# Patient Record
Sex: FEMALE | Race: WHITE | NOT HISPANIC OR LATINO | Employment: FULL TIME | URBAN - METROPOLITAN AREA
[De-identification: names, ages, dates, MRNs, and addresses within clinical notes are randomized per-mention and may not be internally consistent; named-entity substitution may affect disease eponyms.]

---

## 2020-02-25 ENCOUNTER — LAB REQUISITION (OUTPATIENT)
Dept: LAB | Facility: OTHER | Age: 70
End: 2020-02-25

## 2020-02-25 DIAGNOSIS — Z00.00 ANNUAL PHYSICAL EXAM: ICD-10-CM

## 2020-02-25 PROCEDURE — 86481 TB AG RESPONSE T-CELL SUSP: CPT | Performed by: EMERGENCY MEDICINE

## 2020-02-27 LAB
TSPOT INTERPRETATION: NEGATIVE
TSPOT NIL CONTROL INTERPRETATION: NORMAL
TSPOT PANEL A: 0
TSPOT PANEL B: 0
TSPOT POS CONTROL INTERPRETATION: NORMAL

## 2021-07-06 ENCOUNTER — APPOINTMENT (OUTPATIENT)
Dept: MRI IMAGING | Facility: HOSPITAL | Age: 71
End: 2021-07-06

## 2021-07-06 ENCOUNTER — APPOINTMENT (OUTPATIENT)
Dept: GENERAL RADIOLOGY | Facility: HOSPITAL | Age: 71
End: 2021-07-06

## 2021-07-06 ENCOUNTER — APPOINTMENT (OUTPATIENT)
Dept: CT IMAGING | Facility: HOSPITAL | Age: 71
End: 2021-07-06

## 2021-07-06 ENCOUNTER — HOSPITAL ENCOUNTER (INPATIENT)
Facility: HOSPITAL | Age: 71
LOS: 4 days | Discharge: NURSING, REHAB OR LONG-TERM CARE | End: 2021-07-10
Attending: EMERGENCY MEDICINE | Admitting: INTERNAL MEDICINE

## 2021-07-06 DIAGNOSIS — I63.9 ACUTE CVA (CEREBROVASCULAR ACCIDENT) (HCC): Primary | ICD-10-CM

## 2021-07-06 LAB
ALBUMIN SERPL-MCNC: 4.4 G/DL (ref 3.5–5.2)
ALBUMIN/GLOB SERPL: 1.4 G/DL
ALP SERPL-CCNC: 61 U/L (ref 39–117)
ALT SERPL W P-5'-P-CCNC: 20 U/L (ref 1–33)
ANION GAP SERPL CALCULATED.3IONS-SCNC: 12.7 MMOL/L (ref 5–15)
AST SERPL-CCNC: 17 U/L (ref 1–32)
BASOPHILS # BLD AUTO: 0.07 10*3/MM3 (ref 0–0.2)
BASOPHILS NFR BLD AUTO: 1.1 % (ref 0–1.5)
BILIRUB SERPL-MCNC: 0.5 MG/DL (ref 0–1.2)
BUN SERPL-MCNC: 13 MG/DL (ref 8–23)
BUN/CREAT SERPL: 23.6 (ref 7–25)
CALCIUM SPEC-SCNC: 9.8 MG/DL (ref 8.6–10.5)
CHLORIDE SERPL-SCNC: 97 MMOL/L (ref 98–107)
CO2 SERPL-SCNC: 26.3 MMOL/L (ref 22–29)
CREAT SERPL-MCNC: 0.55 MG/DL (ref 0.57–1)
DEPRECATED RDW RBC AUTO: 38.8 FL (ref 37–54)
EOSINOPHIL # BLD AUTO: 0.31 10*3/MM3 (ref 0–0.4)
EOSINOPHIL NFR BLD AUTO: 5 % (ref 0.3–6.2)
ERYTHROCYTE [DISTWIDTH] IN BLOOD BY AUTOMATED COUNT: 12.1 % (ref 12.3–15.4)
GFR SERPL CREATININE-BSD FRML MDRD: 109 ML/MIN/1.73
GLOBULIN UR ELPH-MCNC: 3.2 GM/DL
GLUCOSE BLDC GLUCOMTR-MCNC: 122 MG/DL (ref 70–130)
GLUCOSE BLDC GLUCOMTR-MCNC: 171 MG/DL (ref 70–130)
GLUCOSE BLDC GLUCOMTR-MCNC: 208 MG/DL (ref 70–130)
GLUCOSE SERPL-MCNC: 118 MG/DL (ref 65–99)
HCT VFR BLD AUTO: 40.6 % (ref 34–46.6)
HGB BLD-MCNC: 13.7 G/DL (ref 12–15.9)
IMM GRANULOCYTES # BLD AUTO: 0.01 10*3/MM3 (ref 0–0.05)
IMM GRANULOCYTES NFR BLD AUTO: 0.2 % (ref 0–0.5)
LYMPHOCYTES # BLD AUTO: 2.35 10*3/MM3 (ref 0.7–3.1)
LYMPHOCYTES NFR BLD AUTO: 37.7 % (ref 19.6–45.3)
MCH RBC QN AUTO: 29.6 PG (ref 26.6–33)
MCHC RBC AUTO-ENTMCNC: 33.7 G/DL (ref 31.5–35.7)
MCV RBC AUTO: 87.7 FL (ref 79–97)
MONOCYTES # BLD AUTO: 0.51 10*3/MM3 (ref 0.1–0.9)
MONOCYTES NFR BLD AUTO: 8.2 % (ref 5–12)
NEUTROPHILS NFR BLD AUTO: 2.98 10*3/MM3 (ref 1.7–7)
NEUTROPHILS NFR BLD AUTO: 47.8 % (ref 42.7–76)
NRBC BLD AUTO-RTO: 0 /100 WBC (ref 0–0.2)
PLATELET # BLD AUTO: 296 10*3/MM3 (ref 140–450)
PMV BLD AUTO: 9.6 FL (ref 6–12)
POTASSIUM SERPL-SCNC: 3.2 MMOL/L (ref 3.5–5.2)
PROT SERPL-MCNC: 7.6 G/DL (ref 6–8.5)
QT INTERVAL: 447 MS
RBC # BLD AUTO: 4.63 10*6/MM3 (ref 3.77–5.28)
SARS-COV-2 RNA RESP QL NAA+PROBE: NOT DETECTED
SODIUM SERPL-SCNC: 136 MMOL/L (ref 136–145)
TROPONIN T SERPL-MCNC: <0.01 NG/ML (ref 0–0.03)
WBC # BLD AUTO: 6.23 10*3/MM3 (ref 3.4–10.8)

## 2021-07-06 PROCEDURE — 3E03317 INTRODUCTION OF OTHER THROMBOLYTIC INTO PERIPHERAL VEIN, PERCUTANEOUS APPROACH: ICD-10-PCS | Performed by: PSYCHIATRY & NEUROLOGY

## 2021-07-06 PROCEDURE — 0042T HC CT CEREBRAL PERFUSION W/WO CONTRAST: CPT

## 2021-07-06 PROCEDURE — 70498 CT ANGIOGRAPHY NECK: CPT

## 2021-07-06 PROCEDURE — 99285 EMERGENCY DEPT VISIT HI MDM: CPT

## 2021-07-06 PROCEDURE — 0 IOPAMIDOL PER 1 ML: Performed by: EMERGENCY MEDICINE

## 2021-07-06 PROCEDURE — 85025 COMPLETE CBC W/AUTO DIFF WBC: CPT | Performed by: PHYSICIAN ASSISTANT

## 2021-07-06 PROCEDURE — 70496 CT ANGIOGRAPHY HEAD: CPT

## 2021-07-06 PROCEDURE — 82565 ASSAY OF CREATININE: CPT

## 2021-07-06 PROCEDURE — 25010000002 METHYLPREDNISOLONE PER 125 MG: Performed by: PSYCHIATRY & NEUROLOGY

## 2021-07-06 PROCEDURE — 70551 MRI BRAIN STEM W/O DYE: CPT

## 2021-07-06 PROCEDURE — 63710000001 INSULIN LISPRO (HUMAN) PER 5 UNITS: Performed by: INTERNAL MEDICINE

## 2021-07-06 PROCEDURE — 80053 COMPREHEN METABOLIC PANEL: CPT | Performed by: PHYSICIAN ASSISTANT

## 2021-07-06 PROCEDURE — 93010 ELECTROCARDIOGRAM REPORT: CPT | Performed by: INTERNAL MEDICINE

## 2021-07-06 PROCEDURE — U0003 INFECTIOUS AGENT DETECTION BY NUCLEIC ACID (DNA OR RNA); SEVERE ACUTE RESPIRATORY SYNDROME CORONAVIRUS 2 (SARS-COV-2) (CORONAVIRUS DISEASE [COVID-19]), AMPLIFIED PROBE TECHNIQUE, MAKING USE OF HIGH THROUGHPUT TECHNOLOGIES AS DESCRIBED BY CMS-2020-01-R: HCPCS | Performed by: EMERGENCY MEDICINE

## 2021-07-06 PROCEDURE — 82962 GLUCOSE BLOOD TEST: CPT

## 2021-07-06 PROCEDURE — 93005 ELECTROCARDIOGRAM TRACING: CPT | Performed by: PHYSICIAN ASSISTANT

## 2021-07-06 PROCEDURE — 25010000002 ALTEPLASE PER 1 MG: Performed by: PSYCHIATRY & NEUROLOGY

## 2021-07-06 PROCEDURE — 25010000002 DIPHENHYDRAMINE PER 50 MG

## 2021-07-06 PROCEDURE — 84484 ASSAY OF TROPONIN QUANT: CPT | Performed by: PHYSICIAN ASSISTANT

## 2021-07-06 PROCEDURE — 99291 CRITICAL CARE FIRST HOUR: CPT | Performed by: PSYCHIATRY & NEUROLOGY

## 2021-07-06 PROCEDURE — 4A03X5D MEASUREMENT OF ARTERIAL FLOW, INTRACRANIAL, EXTERNAL APPROACH: ICD-10-PCS | Performed by: RADIOLOGY

## 2021-07-06 RX ORDER — AMLODIPINE BESYLATE 2.5 MG/1
2.5 TABLET ORAL DAILY
COMMUNITY
End: 2021-07-10 | Stop reason: HOSPADM

## 2021-07-06 RX ORDER — LEVOTHYROXINE SODIUM 0.1 MG/1
100 TABLET ORAL DAILY
COMMUNITY

## 2021-07-06 RX ORDER — LEVOTHYROXINE SODIUM 20 UG/ML
50 INJECTION, SOLUTION INTRAVENOUS
Status: DISCONTINUED | OUTPATIENT
Start: 2021-07-06 | End: 2021-07-07

## 2021-07-06 RX ORDER — FLUOXETINE HYDROCHLORIDE 20 MG/1
20 CAPSULE ORAL DAILY
COMMUNITY

## 2021-07-06 RX ORDER — MULTIVIT-MIN/IRON/FOLIC ACID/K 18-600-40
400 CAPSULE ORAL DAILY
COMMUNITY

## 2021-07-06 RX ORDER — POTASSIUM CHLORIDE 1.5 G/1.77G
40 POWDER, FOR SOLUTION ORAL AS NEEDED
Status: DISCONTINUED | OUTPATIENT
Start: 2021-07-06 | End: 2021-07-10 | Stop reason: HOSPADM

## 2021-07-06 RX ORDER — SODIUM CHLORIDE 9 MG/ML
75 INJECTION, SOLUTION INTRAVENOUS CONTINUOUS
Status: DISCONTINUED | OUTPATIENT
Start: 2021-07-06 | End: 2021-07-08

## 2021-07-06 RX ORDER — MAGNESIUM SULFATE HEPTAHYDRATE 40 MG/ML
2 INJECTION, SOLUTION INTRAVENOUS AS NEEDED
Status: DISCONTINUED | OUTPATIENT
Start: 2021-07-06 | End: 2021-07-10 | Stop reason: HOSPADM

## 2021-07-06 RX ORDER — AMOXICILLIN 250 MG
2 CAPSULE ORAL 2 TIMES DAILY
Status: DISCONTINUED | OUTPATIENT
Start: 2021-07-06 | End: 2021-07-10 | Stop reason: HOSPADM

## 2021-07-06 RX ORDER — CALCIUM GLUCONATE 20 MG/ML
1 INJECTION, SOLUTION INTRAVENOUS AS NEEDED
Status: DISCONTINUED | OUTPATIENT
Start: 2021-07-06 | End: 2021-07-10 | Stop reason: HOSPADM

## 2021-07-06 RX ORDER — ONDANSETRON 2 MG/ML
4 INJECTION INTRAMUSCULAR; INTRAVENOUS EVERY 6 HOURS PRN
Status: DISCONTINUED | OUTPATIENT
Start: 2021-07-06 | End: 2021-07-10 | Stop reason: HOSPADM

## 2021-07-06 RX ORDER — POTASSIUM CHLORIDE 750 MG/1
40 TABLET, FILM COATED, EXTENDED RELEASE ORAL AS NEEDED
Status: DISCONTINUED | OUTPATIENT
Start: 2021-07-06 | End: 2021-07-10 | Stop reason: HOSPADM

## 2021-07-06 RX ORDER — ENALAPRILAT 2.5 MG/2ML
0.62 INJECTION INTRAVENOUS EVERY 6 HOURS PRN
Status: DISCONTINUED | OUTPATIENT
Start: 2021-07-06 | End: 2021-07-10 | Stop reason: HOSPADM

## 2021-07-06 RX ORDER — SODIUM CHLORIDE 0.9 % (FLUSH) 0.9 %
10 SYRINGE (ML) INJECTION EVERY 12 HOURS SCHEDULED
Status: DISCONTINUED | OUTPATIENT
Start: 2021-07-06 | End: 2021-07-10

## 2021-07-06 RX ORDER — NICOTINE POLACRILEX 4 MG
15 LOZENGE BUCCAL
Status: DISCONTINUED | OUTPATIENT
Start: 2021-07-06 | End: 2021-07-10 | Stop reason: HOSPADM

## 2021-07-06 RX ORDER — SOLIFENACIN SUCCINATE 5 MG/1
5 TABLET, FILM COATED ORAL DAILY
COMMUNITY
End: 2021-07-10 | Stop reason: HOSPADM

## 2021-07-06 RX ORDER — DEXTROSE MONOHYDRATE 25 G/50ML
25 INJECTION, SOLUTION INTRAVENOUS
Status: DISCONTINUED | OUTPATIENT
Start: 2021-07-06 | End: 2021-07-10 | Stop reason: HOSPADM

## 2021-07-06 RX ORDER — SODIUM CHLORIDE 0.9 % (FLUSH) 0.9 %
10 SYRINGE (ML) INJECTION AS NEEDED
Status: DISCONTINUED | OUTPATIENT
Start: 2021-07-06 | End: 2021-07-10 | Stop reason: HOSPADM

## 2021-07-06 RX ORDER — ATORVASTATIN CALCIUM 80 MG/1
80 TABLET, FILM COATED ORAL NIGHTLY
Status: DISCONTINUED | OUTPATIENT
Start: 2021-07-06 | End: 2021-07-10 | Stop reason: HOSPADM

## 2021-07-06 RX ORDER — ACETAMINOPHEN 650 MG/1
650 SUPPOSITORY RECTAL EVERY 4 HOURS PRN
Status: DISCONTINUED | OUTPATIENT
Start: 2021-07-06 | End: 2021-07-10 | Stop reason: HOSPADM

## 2021-07-06 RX ORDER — BISACODYL 5 MG/1
5 TABLET, DELAYED RELEASE ORAL DAILY PRN
Status: DISCONTINUED | OUTPATIENT
Start: 2021-07-06 | End: 2021-07-10 | Stop reason: HOSPADM

## 2021-07-06 RX ORDER — MAGNESIUM SULFATE HEPTAHYDRATE 40 MG/ML
4 INJECTION, SOLUTION INTRAVENOUS AS NEEDED
Status: DISCONTINUED | OUTPATIENT
Start: 2021-07-06 | End: 2021-07-10 | Stop reason: HOSPADM

## 2021-07-06 RX ORDER — SIMVASTATIN 40 MG
40 TABLET ORAL NIGHTLY
COMMUNITY
End: 2021-07-10 | Stop reason: HOSPADM

## 2021-07-06 RX ORDER — INSULIN LISPRO 100 [IU]/ML
0-9 INJECTION, SOLUTION INTRAVENOUS; SUBCUTANEOUS EVERY 6 HOURS
Status: DISCONTINUED | OUTPATIENT
Start: 2021-07-06 | End: 2021-07-08

## 2021-07-06 RX ORDER — SODIUM CHLORIDE 0.9 % (FLUSH) 0.9 %
10 SYRINGE (ML) INJECTION AS NEEDED
Status: DISCONTINUED | OUTPATIENT
Start: 2021-07-06 | End: 2021-07-09 | Stop reason: SDUPTHER

## 2021-07-06 RX ORDER — ACETAMINOPHEN 325 MG/1
650 TABLET ORAL EVERY 4 HOURS PRN
Status: DISCONTINUED | OUTPATIENT
Start: 2021-07-06 | End: 2021-07-10 | Stop reason: HOSPADM

## 2021-07-06 RX ORDER — LISINOPRIL AND HYDROCHLOROTHIAZIDE 20; 12.5 MG/1; MG/1
1 TABLET ORAL DAILY
COMMUNITY

## 2021-07-06 RX ORDER — SODIUM CHLORIDE 9 MG/ML
100 INJECTION, SOLUTION INTRAVENOUS ONCE
Status: COMPLETED | OUTPATIENT
Start: 2021-07-06 | End: 2021-07-06

## 2021-07-06 RX ORDER — METHYLPREDNISOLONE SODIUM SUCCINATE 125 MG/2ML
125 INJECTION, POWDER, LYOPHILIZED, FOR SOLUTION INTRAMUSCULAR; INTRAVENOUS ONCE
Status: COMPLETED | OUTPATIENT
Start: 2021-07-06 | End: 2021-07-06

## 2021-07-06 RX ORDER — ASPIRIN 325 MG
325 TABLET ORAL DAILY
Status: DISCONTINUED | OUTPATIENT
Start: 2021-07-07 | End: 2021-07-10 | Stop reason: HOSPADM

## 2021-07-06 RX ORDER — ASPIRIN 300 MG/1
300 SUPPOSITORY RECTAL DAILY
Status: DISCONTINUED | OUTPATIENT
Start: 2021-07-07 | End: 2021-07-10

## 2021-07-06 RX ORDER — SODIUM CHLORIDE 0.9 % (FLUSH) 0.9 %
10 SYRINGE (ML) INJECTION EVERY 12 HOURS SCHEDULED
Status: DISCONTINUED | OUTPATIENT
Start: 2021-07-06 | End: 2021-07-10 | Stop reason: HOSPADM

## 2021-07-06 RX ORDER — ONDANSETRON 4 MG/1
4 TABLET, FILM COATED ORAL EVERY 6 HOURS PRN
Status: DISCONTINUED | OUTPATIENT
Start: 2021-07-06 | End: 2021-07-10 | Stop reason: HOSPADM

## 2021-07-06 RX ORDER — DIPHENHYDRAMINE HYDROCHLORIDE 50 MG/ML
50 INJECTION INTRAMUSCULAR; INTRAVENOUS ONCE
Status: COMPLETED | OUTPATIENT
Start: 2021-07-06 | End: 2021-07-06

## 2021-07-06 RX ORDER — DIPHENHYDRAMINE HYDROCHLORIDE 50 MG/ML
INJECTION INTRAMUSCULAR; INTRAVENOUS
Status: COMPLETED
Start: 2021-07-06 | End: 2021-07-06

## 2021-07-06 RX ORDER — BISACODYL 10 MG
10 SUPPOSITORY, RECTAL RECTAL DAILY PRN
Status: DISCONTINUED | OUTPATIENT
Start: 2021-07-06 | End: 2021-07-10 | Stop reason: HOSPADM

## 2021-07-06 RX ORDER — POTASSIUM CHLORIDE 7.45 MG/ML
10 INJECTION INTRAVENOUS
Status: DISCONTINUED | OUTPATIENT
Start: 2021-07-06 | End: 2021-07-10 | Stop reason: HOSPADM

## 2021-07-06 RX ORDER — POLYETHYLENE GLYCOL 3350 17 G/17G
17 POWDER, FOR SOLUTION ORAL DAILY PRN
Status: DISCONTINUED | OUTPATIENT
Start: 2021-07-06 | End: 2021-07-10 | Stop reason: HOSPADM

## 2021-07-06 RX ADMIN — LEVOTHYROXINE SODIUM 50 MCG: 20 INJECTION, SOLUTION INTRAVENOUS at 15:25

## 2021-07-06 RX ADMIN — INSULIN LISPRO 2 UNITS: 100 INJECTION, SOLUTION INTRAVENOUS; SUBCUTANEOUS at 23:27

## 2021-07-06 RX ADMIN — INSULIN LISPRO 4 UNITS: 100 INJECTION, SOLUTION INTRAVENOUS; SUBCUTANEOUS at 17:51

## 2021-07-06 RX ADMIN — ALTEPLASE 65.37 MG: KIT at 09:50

## 2021-07-06 RX ADMIN — SODIUM CHLORIDE, PRESERVATIVE FREE 10 ML: 5 INJECTION INTRAVENOUS at 15:26

## 2021-07-06 RX ADMIN — ALTEPLASE 7.26 MG: KIT at 09:45

## 2021-07-06 RX ADMIN — DIPHENHYDRAMINE HYDROCHLORIDE 50 MG: 50 INJECTION INTRAMUSCULAR; INTRAVENOUS at 11:10

## 2021-07-06 RX ADMIN — SODIUM CHLORIDE, PRESERVATIVE FREE 10 ML: 5 INJECTION INTRAVENOUS at 20:30

## 2021-07-06 RX ADMIN — SODIUM CHLORIDE 75 ML/HR: 9 INJECTION, SOLUTION INTRAVENOUS at 13:55

## 2021-07-06 RX ADMIN — SODIUM CHLORIDE 75 ML/HR: 9 INJECTION, SOLUTION INTRAVENOUS at 23:30

## 2021-07-06 RX ADMIN — DOCUSATE SODIUM 50MG AND SENNOSIDES 8.6MG 2 TABLET: 8.6; 5 TABLET, FILM COATED ORAL at 20:29

## 2021-07-06 RX ADMIN — SODIUM CHLORIDE 100 ML/HR: 9 INJECTION, SOLUTION INTRAVENOUS at 11:21

## 2021-07-06 RX ADMIN — METHYLPREDNISOLONE SODIUM SUCCINATE 125 MG: 125 INJECTION, POWDER, FOR SOLUTION INTRAMUSCULAR; INTRAVENOUS at 11:10

## 2021-07-06 RX ADMIN — IOPAMIDOL 150 ML: 755 INJECTION, SOLUTION INTRAVENOUS at 08:46

## 2021-07-06 RX ADMIN — ATORVASTATIN CALCIUM 80 MG: 80 TABLET, FILM COATED ORAL at 20:29

## 2021-07-06 NOTE — SIGNIFICANT NOTE
07/06/21 1446   OTHER   Discipline speech language pathologist   Rehab Time/Intention   Session Not Performed other (see comments)  (SLP Consult received. RN reported patient not ready for Swallow eval at this time and that she will complete RN swallow screen as appropriate. SLP to check back next date.)

## 2021-07-06 NOTE — CONSULTS
Neurology Consult Note    Consult Date: 7/6/2021    Referring MD: Dr. Peter    Reason for Consult I have been asked to see the patient in neurological consultation to render advice and opinion regarding acute stroke    Ambar Bingham is a 70 y.o. female with past medical history of hypertension and diabetes who presented to the hospital with last known normal at approximately 10 PM last night.  She was found by her son today at 7:30 AM with aphasia.  She presented to the emergency department with aphasia no other focal deficits.  Team D imaging was performed CT head CTA and CT perfusion which revealed a large area of tissue at risk in the left temporal lobe.  Her deficits have not improved since arrival.  She was taken for team D MRI which showed areas of diffusion restriction with no significant T2 flair correlation.  tPA was given.    Past medical history: Essential hypertension, diabetes    ROS: Obtained from family  No fevers, chills  No weakness, numbness , + aphasia    Exam  BP (!) 181/95   Pulse 79   Temp 97.8 °F (36.6 °C) (Tympanic)   Resp 16   Wt 80.7 kg (178 lb)   SpO2 97%   Gen: NAD, vitals reviewed  MS: Awake, alert, receptive aphasia, no neglect, following commands intermittently  CN: visual acuity grossly normal, PERRL, EOMI, no facial droop, no dysarthria  Motor: 5/5 throughout upper and lower extremities, normal tone    DATA:    Lab Results   Component Value Date    GLUCOSE 118 (H) 07/06/2021    CALCIUM 9.8 07/06/2021     07/06/2021    K 3.2 (L) 07/06/2021    CO2 26.3 07/06/2021    CL 97 (L) 07/06/2021    BUN 13 07/06/2021    CREATININE 0.55 (L) 07/06/2021    EGFRIFNONA 109 07/06/2021    BCR 23.6 07/06/2021    ANIONGAP 12.7 07/06/2021     Lab Results   Component Value Date    WBC 6.23 07/06/2021    HGB 13.7 07/06/2021    HCT 40.6 07/06/2021    MCV 87.7 07/06/2021     07/06/2021       Lab review: CBC, BMP unremarkable    Imaging review: I personally reviewed her CT head, CTA head and  neck, CT perfusion and MRI.  CT head showed no acute findings, CT perfusion showed an area of tissue risk in the left temporal lobe with no core infarct, CTA head and neck showed no significant flow-limiting stenosis in the left M1 or M2 use but a likely perisylvian branch occlusion, possibly in an M5 branch.  On additional secondary read by radiology there is some concern for a weblike area of possible thrombus in left carotid artery which may be the original source, MRI shows multiple areas of small restricted diffusion in the left temporal lobe with no significant T2 flair correlation, no increased decrease signal on GRE sequences.  Discussed in detail with the reading radiologist.    Diagnoses:  Stroke, left middle cerebral artery, embolic  Received TPA in the emergency department  Essential hypertension    Comment: Left MCA stroke, perisylvian branch occlusion with Warnicke's aphasia, status post TPA after a wake-up protocol MRI.  Plan to admit to ICU for post TPA care.  Suspected cardioembolic etiology but there is also a area of potential clot in the left ICA that may have been the original source.    PLAN:   Received TPA, goal blood pressure less than 180/105  ICU admission  2D echo complete  IV fluids     I was present in the ED for greater than 31 minutes performing critical care including patient assessment, imaging review, care coordination, and TPA decision making.

## 2021-07-06 NOTE — ED PROVIDER NOTES
EMERGENCY DEPARTMENT ENCOUNTER    Room Number:  I372/1  Date of encounter:  7/6/2021  PCP: Provider, No Known  Historian: Patient, EMS      I used full protective equipment while examining this patient.  This includes face mask, gloves and protective eyewear.  I washed my hands before entering the room and immediately upon leaving the room      HPI:  Chief Complaint: Confusion, altered mental status  A complete HPI/ROS/PMH/PSH/SH/FH are unobtainable due to: Altered mental status, aphasia    Context: Ambar Bingham is a 70 y.o. female who presents to the ED c/o confusion, altered mental status.  Patient was found by her son this morning wandering the house confused and having difficulty speaking.  Patient was last seen normal at 10 PM last night.  At this time patient has marked aphasia and confusion.  She has no dysarthria or limb weakness.  Patient unable to give any reliable history secondary to aphasia.    Review of Medical Records  I reviewed patient's last office visit with her family medicine doctor from 6/21/2021.  Patient being treated for diabetes, hypertension, dyslipidemia.    PAST MEDICAL HISTORY  Active Ambulatory Problems     Diagnosis Date Noted   • No Active Ambulatory Problems     Resolved Ambulatory Problems     Diagnosis Date Noted   • No Resolved Ambulatory Problems     No Additional Past Medical History         PAST SURGICAL HISTORY  No past surgical history on file.      FAMILY HISTORY  No family history on file.      SOCIAL HISTORY  Social History     Socioeconomic History   • Marital status: Single     Spouse name: Not on file   • Number of children: Not on file   • Years of education: Not on file   • Highest education level: Not on file   Tobacco Use   • Smoking status: Never Smoker   • Smokeless tobacco: Never Used         ALLERGIES  Patient has no known allergies.        REVIEW OF SYSTEMS  Unobtainable secondary to altered mental status      PHYSICAL EXAM    I have reviewed the triage vital  signs and nursing notes.    ED Triage Vitals   Temp Heart Rate Resp BP SpO2   07/06/21 0825 07/06/21 0821 07/06/21 0821 07/06/21 0821 07/06/21 0821   97.8 °F (36.6 °C) 73 10 (!) 173/113 97 %      Temp src Heart Rate Source Patient Position BP Location FiO2 (%)   07/06/21 0825 -- -- -- --   Tympanic           Physical Exam    GENERAL: Alert, mild distress, confused   HENT: head atraumatic, no nuchal rigidity  EYES: no scleral icterus, EOMI  CV: regular rhythm, regular rate, no murmur  RESPIRATORY: normal effort, CTA  ABDOMEN: soft, nontender  MUSCULOSKELETAL: no deformity, FROM, no calf swelling or tenderness  NEURO: alert, disoriented to time and place, 5/5 strength in all extremities, no facial droop.  Moderate expressive aphasia.  Patient unable to perform finger-to-nose secondary to confusion.  SKIN: warm, dry    NIH Stroke Scale: 6        LAB RESULTS  Recent Results (from the past 24 hour(s))   Comprehensive Metabolic Panel    Collection Time: 07/06/21  8:24 AM    Specimen: Blood   Result Value Ref Range    Glucose 118 (H) 65 - 99 mg/dL    BUN 13 8 - 23 mg/dL    Creatinine 0.55 (L) 0.57 - 1.00 mg/dL    Sodium 136 136 - 145 mmol/L    Potassium 3.2 (L) 3.5 - 5.2 mmol/L    Chloride 97 (L) 98 - 107 mmol/L    CO2 26.3 22.0 - 29.0 mmol/L    Calcium 9.8 8.6 - 10.5 mg/dL    Total Protein 7.6 6.0 - 8.5 g/dL    Albumin 4.40 3.50 - 5.20 g/dL    ALT (SGPT) 20 1 - 33 U/L    AST (SGOT) 17 1 - 32 U/L    Alkaline Phosphatase 61 39 - 117 U/L    Total Bilirubin 0.5 0.0 - 1.2 mg/dL    eGFR Non African Amer 109 >60 mL/min/1.73    Globulin 3.2 gm/dL    A/G Ratio 1.4 g/dL    BUN/Creatinine Ratio 23.6 7.0 - 25.0    Anion Gap 12.7 5.0 - 15.0 mmol/L   Troponin    Collection Time: 07/06/21  8:24 AM    Specimen: Blood   Result Value Ref Range    Troponin T <0.010 0.000 - 0.030 ng/mL   CBC Auto Differential    Collection Time: 07/06/21  8:24 AM    Specimen: Blood   Result Value Ref Range    WBC 6.23 3.40 - 10.80 10*3/mm3    RBC 4.63 3.77 -  5.28 10*6/mm3    Hemoglobin 13.7 12.0 - 15.9 g/dL    Hematocrit 40.6 34.0 - 46.6 %    MCV 87.7 79.0 - 97.0 fL    MCH 29.6 26.6 - 33.0 pg    MCHC 33.7 31.5 - 35.7 g/dL    RDW 12.1 (L) 12.3 - 15.4 %    RDW-SD 38.8 37.0 - 54.0 fl    MPV 9.6 6.0 - 12.0 fL    Platelets 296 140 - 450 10*3/mm3    Neutrophil % 47.8 42.7 - 76.0 %    Lymphocyte % 37.7 19.6 - 45.3 %    Monocyte % 8.2 5.0 - 12.0 %    Eosinophil % 5.0 0.3 - 6.2 %    Basophil % 1.1 0.0 - 1.5 %    Immature Grans % 0.2 0.0 - 0.5 %    Neutrophils, Absolute 2.98 1.70 - 7.00 10*3/mm3    Lymphocytes, Absolute 2.35 0.70 - 3.10 10*3/mm3    Monocytes, Absolute 0.51 0.10 - 0.90 10*3/mm3    Eosinophils, Absolute 0.31 0.00 - 0.40 10*3/mm3    Basophils, Absolute 0.07 0.00 - 0.20 10*3/mm3    Immature Grans, Absolute 0.01 0.00 - 0.05 10*3/mm3    nRBC 0.0 0.0 - 0.2 /100 WBC   COVID-19,[facility] IN-HOUSE CEPHEID/JOVON NP SWAB IN TRANSPORT MEDIA 8-12 HR TAT - Swab, Nasopharynx    Collection Time: 07/06/21  8:52 AM    Specimen: Nasopharynx; Swab   Result Value Ref Range    COVID19 Not Detected Not Detected - Ref. Range   ECG 12 Lead    Collection Time: 07/06/21 11:22 AM   Result Value Ref Range    QT Interval 447 ms   POC Glucose Once    Collection Time: 07/06/21 12:28 PM    Specimen: Blood   Result Value Ref Range    Glucose 122 70 - 130 mg/dL       Ordered the above labs and independently reviewed the results.        RADIOLOGY  CT Angiogram Neck, CT CEREBRAL PERFUSION WITH & WITHOUT CONTRAST, CT Angiogram Head w AI Analysis of LVO    Result Date: 7/6/2021    CT ANGIOGRAM NECK AND HEAD WITH CONTRAST AND CT PERFUSION WITH CONTRAST  HISTORY: Aphasia, confusion.  COMPARISON: None.  FINDINGS:  Initially, a noncontrasted CT examination of the brain was performed. The brain and ventricles are symmetrical. There is no evidence of hemorrhage, hydrocephalus or of abnormal extra-axial fluid. No focal area of decreased attenuation to suggest acute infarction was identified. The above information  was called to and discussed with Dr. Arzate. Dr. Arzate requested further evaluation with CT perfusion and CT angiography.  CT PERFUSION: There is delayed perfusion involving the inferior division of the left MCA estimated to be approximately 33 cc in volume and without corresponding CBF abnormality.  CT ANGIOGRAM NECK AND HEAD WITH CONTRAST: A CT angiogram of the neck and head was performed. Multiplanar, as well as 3-dimensional reconstructions were generated.  FINDINGS: There is a bovine configuration to the great vessels. There is mild atherosclerotic disease appreciated involving the carotid bifurcations bilaterally, but with 0% stenosis using NASCET criteria. There is a weblike filling defect involving the proximal aspects of the internal carotid artery on the left posteriorly and medially. There is no evidence of dangling clot. The distal aspects of the internal carotid arteries and the proximal aspects of the anterior cerebral arteries and of the right middle cerebral artery appear unremarkable. There is a filling defect appreciated involving the inferior division of the left middle cerebral artery. This involves a prominent branch within the anterior aspect of the sylvian fissure. No other filling defects are appreciated.  Both vertebral arteries were opacified. The left vertebral artery is slightly larger than that of the right. The basilar artery and right posterior cerebral artery appear unremarkable. There is a fetal origin of the left posterior cerebral artery.      1. There was no evidence of acute infarction or hemorrhage on the noncontrasted CT examination of the brain. 2. The CT perfusion did demonstrate 33 cc of delayed perfusion involving the inferior division of the left middle cerebral artery without corresponding CBF abnormality. 3. Clot was identified within the inferior division of the left MCA anteriorly within a prominent anterior sylvian branch. 4. There is mild irregularity involving  the left internal carotid artery with an adjacent thin web. There is no evidence of dangling clot.  The noncontrasted CT examination of the brain was made available for interpretation at 0835 hours and results called to Dr. Arzate at 0836 hours. The CT angiogram was made available for interpretation at 0847 hours and results called to Dr. Arzate at 0850 hours.  AI analysis of LVO was utilized.  Radiation dose reduction techniques were utilized, including automated exposure control and exposure modulation based on body size.       MRI Brain Without Contrast    Result Date: 7/6/2021  LIMITED NONCONTRASTED MRI EXAMINATION OF THE BRAIN  HISTORY: Stroke.  TECHNIQUE: A limited MRI examination of the brain was performed utilizing axial diffusion, T2 FLAIR and gradient echo T2 imaging.  COMPARISON: CT angiogram 07/06/2021.  FINDINGS: The diffusion sequence demonstrates a curvilinear area of restricted diffusion involving the left parietal lobe superiolaterally, cortical and subcortical measuring 10 x 3 mm in size. A smaller subcortical area of restricted diffusion involving the left occipital lobe posteriorly is appreciated measuring approximately 7 mm in size. There is subtle gyriform diffusion weighted hyperintensity involving the parieto-occipital region on the left posterior laterally. There is a questionable mild area of delayed perfusion involving the subcortical white matter of the frontoparietal region on the left superiorly measuring approximately 10 mm in AP dimension.  The T2 FLAIR sequence demonstrates increased signal intensity within branches of the left MCA consistent with slow flow. There is minimal diffusion hyperintensity related to the area of suspected acute infarction involving the left parietal lobe. There was no evidence of hemorrhage.      1. Small areas of restricted diffusion consistent with areas of infarction are noted, as well as subtle diffusion weighted hyperintensity involving the  parieto-occipital cortex posterior laterally. There is only minimal diffusion hyperintensity appreciated. 2. There is increased signal intensity present within the branches of the inferior division of the left MCA consistent with slow flow evident on the axial T2 FLAIR sequence. The above information was discussed with Dr. Arzate while the patient was still in the MRI scanner.        I ordered the above noted radiological studies. Reviewed by me and discussed with radiologist.  See dictation for official radiology interpretation.      MEDICATIONS GIVEN IN ER    Medications   sodium chloride 0.9 % flush 10 mL (has no administration in time range)   sodium chloride 0.9 % flush 10 mL (10 mL Intravenous Not Given 7/6/21 1352)   sodium chloride 0.9 % flush 10 mL (has no administration in time range)   aspirin tablet 325 mg (has no administration in time range)     Or   aspirin suppository 300 mg (has no administration in time range)   atorvastatin (LIPITOR) tablet 80 mg (has no administration in time range)   sodium chloride 0.9 % infusion (75 mL/hr Intravenous New Bag 7/6/21 1355)   Levothyroxine Sodium injection 50 mcg (50 mcg Intravenous Given 7/6/21 1525)   dextrose (GLUTOSE) oral gel 15 g (has no administration in time range)   dextrose (D50W) 25 g/ 50mL Intravenous Solution 25 g (has no administration in time range)   glucagon (human recombinant) (GLUCAGEN DIAGNOSTIC) injection 1 mg (has no administration in time range)   sodium chloride 0.9 % flush 10 mL (10 mL Intravenous Given 7/6/21 1526)   sodium chloride 0.9 % flush 10 mL (has no administration in time range)   acetaminophen (TYLENOL) tablet 650 mg (has no administration in time range)     Or   acetaminophen (TYLENOL) suppository 650 mg (has no administration in time range)   potassium chloride (K-DUR,KLOR-CON) ER tablet 40 mEq (has no administration in time range)     Or   potassium chloride (KLOR-CON) packet 40 mEq (has no administration in time range)      Or   potassium chloride 10 mEq in 100 mL IVPB (has no administration in time range)   Magnesium Sulfate 2 gram Bolus, followed by 8 gram infusion (total Mg dose 10 grams)- Mg less than or equal to 1mg/dL (has no administration in time range)     Or   Magnesium Sulfate 2 gram / 50mL Infusion (GIVE X 3 BAGS TO EQUAL 6GM TOTAL DOSE) - Mg 1.1 - 1.5 mg/dl (has no administration in time range)     Or   Magnesium Sulfate 4 gram infusion- Mg 1.6-1.9 mg/dL (has no administration in time range)   potassium phosphate 45 mmol in sodium chloride 0.9 % 500 mL infusion (has no administration in time range)     Or   potassium phosphate 30 mmol in sodium chloride 0.9 % 250 mL infusion (has no administration in time range)     Or   potassium phosphate 15 mmol in sodium chloride 0.9 % 100 mL infusion (has no administration in time range)     Or   sodium phosphates 40 mmol in sodium chloride 0.9 % 500 mL IVPB (has no administration in time range)     Or   sodium phosphates 20 mmol in sodium chloride 0.9 % 250 mL IVPB (has no administration in time range)   calcium gluconate 1g/50ml 0.675% NaCl IV SOLN (has no administration in time range)     And   calcium gluconate 6 g in sodium chloride 0.9 % 500 mL IVPB (has no administration in time range)   enalaprilat (VASOTEC) injection 0.625 mg (has no administration in time range)   insulin lispro (ADMELOG) injection 0-9 Units (has no administration in time range)   sennosides-docusate (PERICOLACE) 8.6-50 MG per tablet 2 tablet (has no administration in time range)     And   polyethylene glycol (MIRALAX) packet 17 g (has no administration in time range)     And   bisacodyl (DULCOLAX) EC tablet 5 mg (has no administration in time range)     And   bisacodyl (DULCOLAX) suppository 10 mg (has no administration in time range)   ondansetron (ZOFRAN) tablet 4 mg (has no administration in time range)     Or   ondansetron (ZOFRAN) injection 4 mg (has no administration in time range)   iopamidol  (ISOVUE-370) 76 % injection 150 mL (150 mL Intravenous Given by Other 7/6/21 0846)   alteplase (ACTIVASE) bolus from vial (7.26 mg Intravenous Given 7/6/21 0945)   alteplase (ACTIVASE) 100 mg kit (0 mg/kg × 80.7 kg Intravenous Stopped 7/6/21 1122)   sodium chloride 0.9 % infusion (100 mL/hr Intravenous New Bag 7/6/21 1121)   diphenhydrAMINE (BENADRYL) injection 50 mg (50 mg Intravenous Given 7/6/21 1110)   methylPREDNISolone sodium succinate (SOLU-Medrol) injection 125 mg (125 mg Intravenous Given 7/6/21 1110)     Critical Care  Performed by: John Ramsey PA  Authorized by: Brandin Peter MD     Critical care provider statement:     Critical care time (minutes):  33    Critical care time was exclusive of:  Separately billable procedures and treating other patients    Critical care was necessary to treat or prevent imminent or life-threatening deterioration of the following conditions:  CNS failure or compromise    Critical care was time spent personally by me on the following activities:  Blood draw for specimens, discussions with consultants, development of treatment plan with patient or surrogate, evaluation of patient's response to treatment, examination of patient, interpretation of cardiac output measurements, obtaining history from patient or surrogate, ordering and performing treatments and interventions, ordering and review of laboratory studies, ordering and review of radiographic studies, pulse oximetry, re-evaluation of patient's condition and review of old charts        PROGRESS, DATA ANALYSIS, CONSULTS, AND MEDICAL DECISION MAKING    All labs have been independently reviewed by me.  All radiology studies have been reviewed by me and discussed with radiologist dictating the report.   EKG's independently viewed and interpreted by me.  Discussion below represents my analysis of pertinent findings related to patient's condition, differential diagnosis, treatment plan and final disposition.    I have  discussed case with Dr. Peter, emergency room physician.  He has performed his own bedside examination and agrees with treatment plan.    ED Course as of Jul 06 1530   Tue Jul 06, 2021   0820 Patient presents with aphasia, confusion with unknown onset.  Patient last seen normal at 10 PM last night.  Team D called.  NIH stroke scale 6.  Patient is not on any blood thinners.    [EE]   0823 I discussed case with Dr. Arzate, stroke neurologist.  He would like me to order Team D CT evaluation.    [EE]   0826 Dr. Arzate at bedside.  CT scan does show left temporal lobe stroke.  It is not a retrievable clot.  Patient being sent over for wake-up MRI. Pt is a potential TPA candidate based on MRI findings.      [EE]   0903 Hemoglobin: 13.7 [EE]   0903 WBC: 6.23 [EE]   0903 Creatinine(!): 0.55 [EE]   0930 I updated patient's family on plan.  They are in agreement with possible TPA.    [EE]   0946 Dr. Thornton called from MRI.  Patient to be given TPA.  He would like patient admitted to the ICU.    [EE]   1100 Patient with mild urticarial rash over her chest.  IV Benadryl ordered.    [EE]   1126 EKG independently viewed and contemporaneously interpreted by ED physician. Time: 11:22 AM.  Rate 77.  Interpretation: Normal sinus rhythm, normal axis, inferior Q waves, delayed R wave progression, no acute ST changes, prolonged QTC.    [JG]   1143 I discussed case with Dr. Gautam, intensivist.  He agrees to admit the patient to the ICU.    [EE]      ED Course User Index  [EE] John Ramsey PA  [JG] Brandin Peter MD       AS OF 15:30 EDT VITALS:    BP - 123/88  HR - 94  TEMP - 98.4 °F (36.9 °C) (Oral)  O2 SATS - 94%        DIAGNOSIS  Final diagnoses:   Acute CVA (cerebrovascular accident) (CMS/HCC)         DISPOSITION  Admitted           John Ramsey PA  07/06/21 2961

## 2021-07-06 NOTE — CONSULTS
Acute rehab referral received via stroke order set. Patient with no focal weakness. Will sign off.     Thank you!    Clayton Truong RN  p

## 2021-07-06 NOTE — ED NOTES
Rash noted to pt's face and chest. Dr. Thornton called and notified. Verbal order received for 50 mg of benadryl and 125 mg of solumedrol.      Yanet Kemp RN  07/06/21 1123

## 2021-07-06 NOTE — PLAN OF CARE
Goal Outcome Evaluation: Patient admitted to ICU today for acute CVA. NIH was 4 upon arrival with severe aphasia and is now a 2 with mild aphasia. Patient had rash on chest/ face and swelling of tongue post TPA gtt that has since resolved. Passed bedside swallow, is on a CC/cardiac diet and has a purewick. VSS; will CTM.

## 2021-07-06 NOTE — ED PROVIDER NOTES
MD ATTESTATION NOTE  Patient was placed in face mask in first look and the following protective measures were taken unless additional measures were taken and documented below in the ED course. Patient was wearing facemask when I entered the room and throughout our encounter. I wore full protective equipment throughout this patient encounter including a face mask, and gloves. Hand hygiene was performed before donning protective equipment and after removal when leaving the room.    The HAYDEN and I have discussed this patient's history, physical exam, and treatment plan. I have reviewed the documentation and personally had a face to face interaction with the patient. I affirm the HYADEN documentation and agree with their diagnostics, findings, treatment, plan, and disposition.  The attached note describes my personal findings.    Ambar Bingham is a 70 y.o. female who presents to the ED c/o aphasia.  Patient aphasic, history limited.  History supplied by EMS.  Per EMS, patient was last seen normal at 10 PM last night, found by son this morning, confused, difficulty speaking.    On exam:  General: NAD  Head: NCAT  ENT: Extraocular motion intact, pupils equal and round reactive to light, moist mucous membranes  Neck: Supple, trachea midline.  Cardiac: regular rate and rhythm  Lungs: Clear to auscultation bilaterally  Abdomen: Soft, nontender, no rebound tenderness/guarding/rigidity  : Deferred to HAYDEN  Extremities: Moves all extremities well, no peripheral edema  Neuro: Alert, unable to assess orientation secondary to moderate expressive aphasia, extraocular motion intact, no facial droop, moderate expressive aphasia, no slurred speech, 5 out of 5 strength all extremities, sensation intact light touch, intermittently follows commands, neuro exam limited secondary to patient altered mental status and noncompliance.  Skin: Warm, dry.    Medical Decision Making:  After the initial H&P, I discussed pertinent information from  history and physical exam with patient/family.  Discussed differential diagnosis.  Discussed plan for ED evaluation/work-up/treatment.  All questions answered.  Patient/family is agreeable with plan.    ED Course as of Jul 06 1439   Tue Jul 06, 2021   0820 Patient presents with aphasia, confusion with unknown onset.  Patient last seen normal at 10 PM last night.  Team D called.  NIH stroke scale 6.  Patient is not on any blood thinners.    [EE]   0823 I discussed case with Dr. Arzate, stroke neurologist.  He would like me to order Team D CT evaluation.    [EE]   0826 Dr. Arzate at bedside.  CT scan does show left temporal lobe stroke.  It is not a retrievable clot.  Patient being sent over for wake-up MRI. Pt is a potential TPA candidate based on MRI findings.      [EE]   0903 Hemoglobin: 13.7 [EE]   0903 WBC: 6.23 [EE]   0903 Creatinine(!): 0.55 [EE]   0930 I updated patient's family on plan.  They are in agreement with possible TPA.    [EE]   0946 Dr. Thornton called from MRI.  Patient to be given TPA.  He would like patient admitted to the ICU.    [EE]   1100 Patient with mild urticarial rash over her chest.  IV Benadryl ordered.    [EE]   1126 EKG independently viewed and contemporaneously interpreted by ED physician. Time: 11:22 AM.  Rate 77.  Interpretation: Normal sinus rhythm, normal axis, inferior Q waves, delayed R wave progression, no acute ST changes, prolonged QTC.    [JG]   1143 I discussed case with Dr. Gautam, intensivist.  He agrees to admit the patient to the ICU.    [EE]      ED Course User Index  [EE] John Ramsey, PA  [JG] Brandin Peter MD       Diagnosis  Final diagnoses:   Acute CVA (cerebrovascular accident) (CMS/[facility])        Brandin Peter MD  07/06/21 9316

## 2021-07-06 NOTE — H&P
[facility]    CC: UTO    HPI:  Mrs. Bingham is a 69yo Female with HTN and DM she was brought to the ER today after being last seen normal 10PM yesterday.  She was found by her son this morning with aphasia at 7:30 AM.  Team D was called and she was found to have large left temporal area of tissue at risk.  TPA has been delivered and admission to ICU has been requested    PMH:  HTN  DM  Hypothyroidism  Depression  HLD  Urinary frequency    Social History     Socioeconomic History   • Marital status: Single     Spouse name: Not on file   • Number of children: Not on file   • Years of education: Not on file   • Highest education level: Not on file     No family history on file.  MEDS: reviewed as per chart  ALL: NKDA  ROS: UTO    Vital Sign Min/Max for last 24 hours  Temp  Min: 97.8 °F (36.6 °C)  Max: 98.4 °F (36.9 °C)   BP  Min: 124/86  Max: 181/95   Pulse  Min: 72  Max: 80   Resp  Min: 10  Max: 16   SpO2  Min: 92 %  Max: 98 %   No data recorded   Weight  Min: 80.7 kg (178 lb)  Max: 80.7 kg (178 lb)     GEN:  appears ill,alert, exprssive aphasia, receptive to a lesser degree  HEENT: PERRL, EOMI, normal sclera, mmm, no jvd, trachea midline, neck supple  CHEST: CTA bilat, no wheezes, no crackles, no use of accessory muscles  CV: RRR, no m/g/r  ABD: soft, nt, nd +bs, no hepatosplenomegaly  EXT: no c/c/e  SKIN: no rashes, no xanthomas, nl turgor, warm, dry  LYMPH: no palpable cervical or supraclavicular lymphadenopathy  NEURO: CN 2-12 intact and symmetric bilaterally  PSYCH: deferred given aphasia  MSK: no kyphoscoliosis, 5/5 strength ue and le bilaterally    Labs: 7/6: reviewed  Glucose 118  Bun 13  Cr 0.55  Na 136  k 3.2  Bicarb 26  Trop 0.01  Wbc 6.2  Hg 13.7  plts 296    7/6: reviewed: MRI brain:  1. Small areas of restricted diffusion consistent with areas of   infarction are noted, as well as subtle diffusion weighted   hyperintensity involving the parieto-occipital cortex posterior   laterally. There is  only minimal diffusion hyperintensity appreciated.   2. There is increased signal intensity present within the branches of   the inferior division of the left MCA consistent with slow flow evident   on the axial T2 FLAIR sequence.     A/P:  1. Acute CVA s/p TPA -- continue care as per protocol, neuro checks in ICU. bp control. Risk factor modification, will need 2d echo.  2. HTN -- goal bp less than 180/105; iv meds as needed  3. HLD --statin  4. Hypokalemia -- replace  5. DM -- ssi  6. Depression - can resume ssri when able to take po  7. Hypothyroidism -- continue replacement    CC 35 mins.

## 2021-07-07 ENCOUNTER — APPOINTMENT (OUTPATIENT)
Dept: CARDIOLOGY | Facility: HOSPITAL | Age: 71
End: 2021-07-07

## 2021-07-07 ENCOUNTER — APPOINTMENT (OUTPATIENT)
Dept: CT IMAGING | Facility: HOSPITAL | Age: 71
End: 2021-07-07

## 2021-07-07 LAB
ALBUMIN SERPL-MCNC: 3.9 G/DL (ref 3.5–5.2)
ALBUMIN/GLOB SERPL: 1.3 G/DL
ALP SERPL-CCNC: 56 U/L (ref 39–117)
ALT SERPL W P-5'-P-CCNC: 16 U/L (ref 1–33)
ANION GAP SERPL CALCULATED.3IONS-SCNC: 11.9 MMOL/L (ref 5–15)
AORTIC DIMENSIONLESS INDEX: 0.9 (DI)
AST SERPL-CCNC: 16 U/L (ref 1–32)
BASOPHILS # BLD AUTO: 0.01 10*3/MM3 (ref 0–0.2)
BASOPHILS NFR BLD AUTO: 0.1 % (ref 0–1.5)
BH CV ECHO MEAS - ACS: 1.6 CM
BH CV ECHO MEAS - AO MAX PG (FULL): 1.2 MMHG
BH CV ECHO MEAS - AO MAX PG: 7.2 MMHG
BH CV ECHO MEAS - AO MEAN PG (FULL): 1 MMHG
BH CV ECHO MEAS - AO MEAN PG: 4 MMHG
BH CV ECHO MEAS - AO ROOT AREA (BSA CORRECTED): 1.5
BH CV ECHO MEAS - AO ROOT AREA: 6.6 CM^2
BH CV ECHO MEAS - AO ROOT DIAM: 2.9 CM
BH CV ECHO MEAS - AO V2 MAX: 134 CM/SEC
BH CV ECHO MEAS - AO V2 MEAN: 94.7 CM/SEC
BH CV ECHO MEAS - AO V2 VTI: 28.2 CM
BH CV ECHO MEAS - ASC AORTA: 3.3 CM
BH CV ECHO MEAS - AVA(I,A): 2.2 CM^2
BH CV ECHO MEAS - AVA(I,D): 2.2 CM^2
BH CV ECHO MEAS - AVA(V,A): 2.3 CM^2
BH CV ECHO MEAS - AVA(V,D): 2.3 CM^2
BH CV ECHO MEAS - BSA(HAYCOCK): 1.9 M^2
BH CV ECHO MEAS - BSA: 1.9 M^2
BH CV ECHO MEAS - BZI_BMI: 29.5 KILOGRAMS/M^2
BH CV ECHO MEAS - BZI_METRIC_HEIGHT: 165.1 CM
BH CV ECHO MEAS - BZI_METRIC_WEIGHT: 80.3 KG
BH CV ECHO MEAS - CONTRAST EF 4CH: 66 CM2
BH CV ECHO MEAS - EDV(CUBED): 79.5 ML
BH CV ECHO MEAS - EDV(MOD-SP2): 77 ML
BH CV ECHO MEAS - EDV(MOD-SP4): 62 ML
BH CV ECHO MEAS - EDV(TEICH): 83.1 ML
BH CV ECHO MEAS - EF(CUBED): 75.2 %
BH CV ECHO MEAS - EF(MOD-BP): 65.6 %
BH CV ECHO MEAS - EF(MOD-SP2): 70.1 %
BH CV ECHO MEAS - EF(MOD-SP4): 59.7 %
BH CV ECHO MEAS - EF(TEICH): 67.5 %
BH CV ECHO MEAS - ESV(CUBED): 19.7 ML
BH CV ECHO MEAS - ESV(MOD-SP2): 23 ML
BH CV ECHO MEAS - ESV(MOD-SP4): 25 ML
BH CV ECHO MEAS - ESV(TEICH): 27 ML
BH CV ECHO MEAS - FS: 37.2 %
BH CV ECHO MEAS - IVS/LVPW: 1.1
BH CV ECHO MEAS - IVSD: 1.1 CM
BH CV ECHO MEAS - LAT PEAK E' VEL: 7.2 CM/SEC
BH CV ECHO MEAS - LV DIASTOLIC VOL/BSA (35-75): 33 ML/M^2
BH CV ECHO MEAS - LV MASS(C)D: 152.6 GRAMS
BH CV ECHO MEAS - LV MASS(C)DI: 81.2 GRAMS/M^2
BH CV ECHO MEAS - LV MAX PG: 6 MMHG
BH CV ECHO MEAS - LV MEAN PG: 3 MMHG
BH CV ECHO MEAS - LV SYSTOLIC VOL/BSA (12-30): 13.3 ML/M^2
BH CV ECHO MEAS - LV V1 MAX: 122 CM/SEC
BH CV ECHO MEAS - LV V1 MEAN: 78.7 CM/SEC
BH CV ECHO MEAS - LV V1 VTI: 24.5 CM
BH CV ECHO MEAS - LVIDD: 4.3 CM
BH CV ECHO MEAS - LVIDS: 2.7 CM
BH CV ECHO MEAS - LVLD AP2: 6.8 CM
BH CV ECHO MEAS - LVLD AP4: 7 CM
BH CV ECHO MEAS - LVLS AP2: 5.9 CM
BH CV ECHO MEAS - LVLS AP4: 6 CM
BH CV ECHO MEAS - LVOT AREA (M): 2.5 CM^2
BH CV ECHO MEAS - LVOT AREA: 2.5 CM^2
BH CV ECHO MEAS - LVOT DIAM: 1.8 CM
BH CV ECHO MEAS - LVPWD: 1 CM
BH CV ECHO MEAS - MED PEAK E' VEL: 9.7 CM/SEC
BH CV ECHO MEAS - MV A DUR: 0.1 SEC
BH CV ECHO MEAS - MV A MAX VEL: 128 CM/SEC
BH CV ECHO MEAS - MV DEC SLOPE: 524 CM/SEC^2
BH CV ECHO MEAS - MV DEC TIME: 200 SEC
BH CV ECHO MEAS - MV E MAX VEL: 120 CM/SEC
BH CV ECHO MEAS - MV E/A: 0.94
BH CV ECHO MEAS - MV MAX PG: 6.7 MMHG
BH CV ECHO MEAS - MV MEAN PG: 3 MMHG
BH CV ECHO MEAS - MV P1/2T MAX VEL: 131 CM/SEC
BH CV ECHO MEAS - MV P1/2T: 73.2 MSEC
BH CV ECHO MEAS - MV V2 MAX: 129 CM/SEC
BH CV ECHO MEAS - MV V2 MEAN: 84.6 CM/SEC
BH CV ECHO MEAS - MV V2 VTI: 28.6 CM
BH CV ECHO MEAS - MVA P1/2T LCG: 1.7 CM^2
BH CV ECHO MEAS - MVA(P1/2T): 3 CM^2
BH CV ECHO MEAS - MVA(VTI): 2.2 CM^2
BH CV ECHO MEAS - PA ACC TIME: 0.14 SEC
BH CV ECHO MEAS - PA MAX PG (FULL): 2.5 MMHG
BH CV ECHO MEAS - PA MAX PG: 3.6 MMHG
BH CV ECHO MEAS - PA PR(ACCEL): 18.3 MMHG
BH CV ECHO MEAS - PA V2 MAX: 95 CM/SEC
BH CV ECHO MEAS - PULM A REVS DUR: 0.15 SEC
BH CV ECHO MEAS - PULM A REVS VEL: 30.8 CM/SEC
BH CV ECHO MEAS - PULM DIAS VEL: 49.4 CM/SEC
BH CV ECHO MEAS - PULM S/D: 1.3
BH CV ECHO MEAS - PULM SYS VEL: 63.2 CM/SEC
BH CV ECHO MEAS - PVA(V,A): 2 CM^2
BH CV ECHO MEAS - PVA(V,D): 2 CM^2
BH CV ECHO MEAS - QP/QS: 0.64
BH CV ECHO MEAS - RAP SYSTOLE: 3 MMHG
BH CV ECHO MEAS - RV MAX PG: 1.1 MMHG
BH CV ECHO MEAS - RV MEAN PG: 1 MMHG
BH CV ECHO MEAS - RV V1 MAX: 53.5 CM/SEC
BH CV ECHO MEAS - RV V1 MEAN: 36.1 CM/SEC
BH CV ECHO MEAS - RV V1 VTI: 11.5 CM
BH CV ECHO MEAS - RVOT AREA: 3.5 CM^2
BH CV ECHO MEAS - RVOT DIAM: 2.1 CM
BH CV ECHO MEAS - RVSP: 28.6 MMHG
BH CV ECHO MEAS - SI(AO): 99.2 ML/M^2
BH CV ECHO MEAS - SI(CUBED): 31.9 ML/M^2
BH CV ECHO MEAS - SI(LVOT): 33.2 ML/M^2
BH CV ECHO MEAS - SI(MOD-SP2): 28.8 ML/M^2
BH CV ECHO MEAS - SI(MOD-SP4): 19.7 ML/M^2
BH CV ECHO MEAS - SI(TEICH): 29.8 ML/M^2
BH CV ECHO MEAS - SV(AO): 186.3 ML
BH CV ECHO MEAS - SV(CUBED): 59.8 ML
BH CV ECHO MEAS - SV(LVOT): 62.3 ML
BH CV ECHO MEAS - SV(MOD-SP2): 54 ML
BH CV ECHO MEAS - SV(MOD-SP4): 37 ML
BH CV ECHO MEAS - SV(RVOT): 39.8 ML
BH CV ECHO MEAS - SV(TEICH): 56.1 ML
BH CV ECHO MEAS - TAPSE (>1.6): 2.1 CM
BH CV ECHO MEAS - TR MAX VEL: 253 CM/SEC
BH CV ECHO MEASUREMENTS AVERAGE E/E' RATIO: 14.2
BH CV VAS BP RIGHT ARM: NORMAL MMHG
BH CV XLRA - RV BASE: 3 CM
BH CV XLRA - RV LENGTH: 7.1 CM
BH CV XLRA - RV MID: 1.7 CM
BH CV XLRA - TDI S': 12 CM/SEC
BILIRUB SERPL-MCNC: 0.4 MG/DL (ref 0–1.2)
BUN SERPL-MCNC: 15 MG/DL (ref 8–23)
BUN/CREAT SERPL: 25.9 (ref 7–25)
CALCIUM SPEC-SCNC: 9.3 MG/DL (ref 8.6–10.5)
CHLORIDE SERPL-SCNC: 99 MMOL/L (ref 98–107)
CHOLEST SERPL-MCNC: 134 MG/DL (ref 0–200)
CO2 SERPL-SCNC: 25.1 MMOL/L (ref 22–29)
CREAT SERPL-MCNC: 0.58 MG/DL (ref 0.57–1)
DEPRECATED RDW RBC AUTO: 40.1 FL (ref 37–54)
EOSINOPHIL # BLD AUTO: 0 10*3/MM3 (ref 0–0.4)
EOSINOPHIL NFR BLD AUTO: 0 % (ref 0.3–6.2)
ERYTHROCYTE [DISTWIDTH] IN BLOOD BY AUTOMATED COUNT: 12.1 % (ref 12.3–15.4)
GFR SERPL CREATININE-BSD FRML MDRD: 103 ML/MIN/1.73
GLOBULIN UR ELPH-MCNC: 3.1 GM/DL
GLUCOSE BLDC GLUCOMTR-MCNC: 104 MG/DL (ref 70–130)
GLUCOSE BLDC GLUCOMTR-MCNC: 117 MG/DL (ref 70–130)
GLUCOSE BLDC GLUCOMTR-MCNC: 130 MG/DL (ref 70–130)
GLUCOSE BLDC GLUCOMTR-MCNC: 161 MG/DL (ref 70–130)
GLUCOSE SERPL-MCNC: 158 MG/DL (ref 65–99)
HBA1C MFR BLD: 6.2 % (ref 4.8–5.6)
HCT VFR BLD AUTO: 41.2 % (ref 34–46.6)
HDLC SERPL-MCNC: 50 MG/DL (ref 40–60)
HGB BLD-MCNC: 13.8 G/DL (ref 12–15.9)
IMM GRANULOCYTES # BLD AUTO: 0.05 10*3/MM3 (ref 0–0.05)
IMM GRANULOCYTES NFR BLD AUTO: 0.5 % (ref 0–0.5)
LDLC SERPL CALC-MCNC: 69 MG/DL (ref 0–100)
LDLC/HDLC SERPL: 1.39 {RATIO}
LEFT ATRIUM VOLUME INDEX: 21 ML/M2
LV EF 2D ECHO EST: 66 %
LYMPHOCYTES # BLD AUTO: 1.12 10*3/MM3 (ref 0.7–3.1)
LYMPHOCYTES NFR BLD AUTO: 10.3 % (ref 19.6–45.3)
MAXIMAL PREDICTED HEART RATE: 150 BPM
MCH RBC QN AUTO: 30.3 PG (ref 26.6–33)
MCHC RBC AUTO-ENTMCNC: 33.5 G/DL (ref 31.5–35.7)
MCV RBC AUTO: 90.4 FL (ref 79–97)
MONOCYTES # BLD AUTO: 0.29 10*3/MM3 (ref 0.1–0.9)
MONOCYTES NFR BLD AUTO: 2.7 % (ref 5–12)
NEUTROPHILS NFR BLD AUTO: 86.4 % (ref 42.7–76)
NEUTROPHILS NFR BLD AUTO: 9.36 10*3/MM3 (ref 1.7–7)
NRBC BLD AUTO-RTO: 0 /100 WBC (ref 0–0.2)
PLATELET # BLD AUTO: 338 10*3/MM3 (ref 140–450)
PMV BLD AUTO: 9.8 FL (ref 6–12)
POTASSIUM SERPL-SCNC: 3.3 MMOL/L (ref 3.5–5.2)
PROT SERPL-MCNC: 7 G/DL (ref 6–8.5)
RBC # BLD AUTO: 4.56 10*6/MM3 (ref 3.77–5.28)
SODIUM SERPL-SCNC: 136 MMOL/L (ref 136–145)
STRESS TARGET HR: 128 BPM
TRIGL SERPL-MCNC: 73 MG/DL (ref 0–150)
VLDLC SERPL-MCNC: 15 MG/DL (ref 5–40)
WBC # BLD AUTO: 10.83 10*3/MM3 (ref 3.4–10.8)

## 2021-07-07 PROCEDURE — 80053 COMPREHEN METABOLIC PANEL: CPT | Performed by: INTERNAL MEDICINE

## 2021-07-07 PROCEDURE — 93306 TTE W/DOPPLER COMPLETE: CPT | Performed by: INTERNAL MEDICINE

## 2021-07-07 PROCEDURE — 82962 GLUCOSE BLOOD TEST: CPT

## 2021-07-07 PROCEDURE — 80061 LIPID PANEL: CPT | Performed by: PSYCHIATRY & NEUROLOGY

## 2021-07-07 PROCEDURE — 70450 CT HEAD/BRAIN W/O DYE: CPT

## 2021-07-07 PROCEDURE — 93306 TTE W/DOPPLER COMPLETE: CPT

## 2021-07-07 PROCEDURE — 25010000002 HYDROMORPHONE 1 MG/ML SOLUTION: Performed by: INTERNAL MEDICINE

## 2021-07-07 PROCEDURE — 94799 UNLISTED PULMONARY SVC/PX: CPT

## 2021-07-07 PROCEDURE — 63710000001 INSULIN LISPRO (HUMAN) PER 5 UNITS: Performed by: INTERNAL MEDICINE

## 2021-07-07 PROCEDURE — 85025 COMPLETE CBC W/AUTO DIFF WBC: CPT | Performed by: INTERNAL MEDICINE

## 2021-07-07 PROCEDURE — 97535 SELF CARE MNGMENT TRAINING: CPT

## 2021-07-07 PROCEDURE — 83036 HEMOGLOBIN GLYCOSYLATED A1C: CPT | Performed by: PSYCHIATRY & NEUROLOGY

## 2021-07-07 PROCEDURE — 97166 OT EVAL MOD COMPLEX 45 MIN: CPT

## 2021-07-07 PROCEDURE — 99232 SBSQ HOSP IP/OBS MODERATE 35: CPT | Performed by: PSYCHIATRY & NEUROLOGY

## 2021-07-07 RX ORDER — LEVOTHYROXINE SODIUM 0.1 MG/1
100 TABLET ORAL
Status: DISCONTINUED | OUTPATIENT
Start: 2021-07-08 | End: 2021-07-10 | Stop reason: HOSPADM

## 2021-07-07 RX ADMIN — HYDROMORPHONE HYDROCHLORIDE 1 MG: 1 INJECTION, SOLUTION INTRAMUSCULAR; INTRAVENOUS; SUBCUTANEOUS at 17:08

## 2021-07-07 RX ADMIN — SODIUM CHLORIDE, PRESERVATIVE FREE 10 ML: 5 INJECTION INTRAVENOUS at 21:20

## 2021-07-07 RX ADMIN — POTASSIUM CHLORIDE 40 MEQ: 750 TABLET, EXTENDED RELEASE ORAL at 14:27

## 2021-07-07 RX ADMIN — DOCUSATE SODIUM 50MG AND SENNOSIDES 8.6MG 2 TABLET: 8.6; 5 TABLET, FILM COATED ORAL at 21:20

## 2021-07-07 RX ADMIN — SODIUM CHLORIDE 75 ML/HR: 9 INJECTION, SOLUTION INTRAVENOUS at 15:48

## 2021-07-07 RX ADMIN — HYDROMORPHONE HYDROCHLORIDE 1 MG: 1 INJECTION, SOLUTION INTRAMUSCULAR; INTRAVENOUS; SUBCUTANEOUS at 18:30

## 2021-07-07 RX ADMIN — LEVOTHYROXINE SODIUM 50 MCG: 20 INJECTION, SOLUTION INTRAVENOUS at 11:21

## 2021-07-07 RX ADMIN — SODIUM CHLORIDE, PRESERVATIVE FREE 10 ML: 5 INJECTION INTRAVENOUS at 10:11

## 2021-07-07 RX ADMIN — DOCUSATE SODIUM 50MG AND SENNOSIDES 8.6MG 2 TABLET: 8.6; 5 TABLET, FILM COATED ORAL at 11:21

## 2021-07-07 RX ADMIN — ACETAMINOPHEN 650 MG: 325 TABLET, FILM COATED ORAL at 14:27

## 2021-07-07 RX ADMIN — ATORVASTATIN CALCIUM 80 MG: 80 TABLET, FILM COATED ORAL at 21:20

## 2021-07-07 RX ADMIN — ASPIRIN 325 MG: 325 TABLET ORAL at 11:21

## 2021-07-07 RX ADMIN — INSULIN LISPRO 2 UNITS: 100 INJECTION, SOLUTION INTRAVENOUS; SUBCUTANEOUS at 06:03

## 2021-07-07 RX ADMIN — POTASSIUM CHLORIDE 40 MEQ: 1.5 POWDER, FOR SOLUTION ORAL at 06:03

## 2021-07-07 NOTE — DISCHARGE PLACEMENT REQUEST
"Ambar Bingham (70 y.o. Female)     Date of Birth Social Security Number Address Home Phone MRN    1950  [id number] [id number] [phone] [id number]    Confucianist Marital Status          [employer] Single       Admission Date Admission Type Admitting Provider Attending Provider Department, Room/Bed    7/6/21 Emergency Skinny Gautam MD Kellie, Scott P, MD [facility] INTENSIVE CARE, I372/1    Discharge Date Discharge Disposition Discharge Destination                       Attending Provider: Skinny Gautam MD    Allergies: No Known Allergies    Isolation: None   Infection: None   Code Status: CPR    Ht: 165.1 cm (65\")   Wt: 80.3 kg (177 lb 0.5 oz)    Admission Cmt: None   Principal Problem: None                Active Insurance as of 7/6/2021     Primary Coverage     Payor Plan Insurance Group Employer/Plan Group    [employer]HEM BLUE CROSS AN[employer]M BLUE CROSS BLUE SHIELD PPO [id number]     Payor Plan Address Payor Plan Phone Number Payor Plan Fax Number Effective Dates    [address] [phone]  1/1/2021 - None Entered    [facility] [id number]       Subscriber Name Subscriber Birth Date Member ID       AMBAR BINGHAM 1950 [id number]           Secondary Coverage     Payor Plan Insurance Group Employer/Plan Group    MEDICARE MEDICARE A ONLY      Payor Plan Address Payor Plan Phone Number Payor Plan Fax Number Effective Dates    [address] [phone]  11/1/2015 - None Entered    [facility] [id number]       Subscriber Name Subscriber Birth Date Member ID       AMBAR BINGHAM 1950 [id number]                 Emergency Contacts      (Rel.) Home Phone Work Phone Mobile Phone    RAUL BINGHAM (Son) -- -- [phone]    Pranav Janie (Sister) -- -- [phone]              "

## 2021-07-07 NOTE — PAYOR COMM NOTE
"Ambar Nelson (70 y.o. Female)                       ATTENTION;    INITIAL CLINICALS FOR REVIEW AUTH PENDING [id number], REPLY TO UR DEPT                       APPLE CRUZ LPN  [phone] OR CALL          Date of Birth Social Security Number Address Home Phone MRN    1950  [id number] [facility] [address] [phone] [id number]    Congregational Marital Status          Buddhist Single       Admission Date Admission Type Admitting Provider Attending Provider Department, Room/Bed    7/6/21 Emergency Skinny Gautam MD Kellie, Scott P, MD [facility] INTENSIVE CARE, I372/1    Discharge Date Discharge Disposition Discharge Destination                       Attending Provider: Skinny Gautam MD    Allergies: No Known Allergies    Isolation: None   Infection: None   Code Status: CPR    Ht: 165.1 cm (65\")   Wt: 80.3 kg (177 lb 0.5 oz)    Admission Cmt: None   Principal Problem: None                Active Insurance as of 7/6/2021     Primary Coverage     Payor Plan Insurance Group Employer/Plan Group    ANTHEM BLUE CROSS ANTH BLUE CROSS BLUE SHIELD PPO [id number]     Payor Plan Address Payor Plan Phone Number Payor Plan Fax Number Effective Dates    [address] [phone]  1/1/2021 - None Entered    [facility] [id number]       Subscriber Name Subscriber Birth Date Member ID       AMBAR NELSON 1950 [id number]           Secondary Coverage     Payor Plan Insurance Group Employer/Plan Group    MEDICARE MEDICARE A ONLY      Payor Plan Address Payor Plan Phone Number Payor Plan Fax Number Effective Dates    [address] [phone]  11/1/2015 - None Entered    [facility] [id number]       Subscriber Name Subscriber Birth Date Member ID       AMBAR NELSON 1950 [id number]                 Emergency Contacts      (Rel.) Home Phone Work Phone Mobile Phone    RAUL NELSON (Son) -- -- [phone]    Janie Rock (Sister) -- -- [phone]               History & " Physical      Skinny Gautam MD at 07/06/21 1322          [facility]    CC: UTO    HPI:  Mrs. Bingham is a 71yo Female with HTN and DM she was brought to the ER today after being last seen normal 10PM yesterday.  She was found by her son this morning with aphasia at 7:30 AM.  Team D was called and she was found to have large left temporal area of tissue at risk.  TPA has been delivered and admission to ICU has been requested    PMH:  HTN  DM  Hypothyroidism  Depression  HLD  Urinary frequency    Social History     Socioeconomic History   • Marital status: Single     Spouse name: Not on file   • Number of children: Not on file   • Years of education: Not on file   • Highest education level: Not on file     No family history on file.  MEDS: reviewed as per chart  ALL: NKDA  ROS: UTO    Vital Sign Min/Max for last 24 hours  Temp  Min: 97.8 °F (36.6 °C)  Max: 98.4 °F (36.9 °C)   BP  Min: 124/86  Max: 181/95   Pulse  Min: 72  Max: 80   Resp  Min: 10  Max: 16   SpO2  Min: 92 %  Max: 98 %   No data recorded   Weight  Min: 80.7 kg (178 lb)  Max: 80.7 kg (178 lb)     GEN:  appears ill,alert, exprssive aphasia, receptive to a lesser degree  HEENT: PERRL, EOMI, normal sclera, mmm, no jvd, trachea midline, neck supple  CHEST: CTA bilat, no wheezes, no crackles, no use of accessory muscles  CV: RRR, no m/g/r  ABD: soft, nt, nd +bs, no hepatosplenomegaly  EXT: no c/c/e  SKIN: no rashes, no xanthomas, nl turgor, warm, dry  LYMPH: no palpable cervical or supraclavicular lymphadenopathy  NEURO: CN 2-12 intact and symmetric bilaterally  PSYCH: deferred given aphasia  MSK: no kyphoscoliosis, 5/5 strength ue and le bilaterally    Labs: 7/6: reviewed  Glucose 118  Bun 13  Cr 0.55  Na 136  k 3.2  Bicarb 26  Trop 0.01  Wbc 6.2  Hg 13.7  plts 296    7/6: reviewed: MRI brain:  1. Small areas of restricted diffusion consistent with areas of   infarction are noted, as well as subtle diffusion weighted   hyperintensity involving  the parieto-occipital cortex posterior   laterally. There is only minimal diffusion hyperintensity appreciated.   2. There is increased signal intensity present within the branches of   the inferior division of the left MCA consistent with slow flow evident   on the axial T2 FLAIR sequence.     A/P:  1. Acute CVA s/p TPA -- continue care as per protocol, neuro checks in ICU. bp control. Risk factor modification, will need 2d echo.  2. HTN -- goal bp less than 180/105; iv meds as needed  3. HLD --statin  4. Hypokalemia -- replace  5. DM -- ssi  6. Depression - can resume ssri when able to take po  7. Hypothyroidism -- continue replacement    CC 35 mins.     Electronically signed by Skinny Gautam MD at 07/06/21 1349          Emergency Department Notes      John Ramsey PA at 07/06/21 0825      Procedure Orders    1. Critical Care [[id number]] ordered by John Ramsey PA                EMERGENCY DEPARTMENT ENCOUNTER    Room Number:  I372/1  Date of encounter:  7/6/2021  PCP: Provider, No Known  Historian: Patient, EMS      I used full protective equipment while examining this patient.  This includes face mask, gloves and protective eyewear.  I washed my hands before entering the room and immediately upon leaving the room      HPI:  Chief Complaint: Confusion, altered mental status  A complete HPI/ROS/PMH/PSH/SH/FH are unobtainable due to: Altered mental status, aphasia    Context: Ambar Bingham is a 70 y.o. female who presents to the ED c/o confusion, altered mental status.  Patient was found by her son this morning wandering the house confused and having difficulty speaking.  Patient was last seen normal at 10 PM last night.  At this time patient has marked aphasia and confusion.  She has no dysarthria or limb weakness.  Patient unable to give any reliable history secondary to aphasia.    Review of Medical Records  I reviewed patient's last office visit with her family medicine doctor from 6/21/2021.  Patient being  treated for diabetes, hypertension, dyslipidemia.    PAST MEDICAL HISTORY  Active Ambulatory Problems     Diagnosis Date Noted   • No Active Ambulatory Problems     Resolved Ambulatory Problems     Diagnosis Date Noted   • No Resolved Ambulatory Problems     No Additional Past Medical History         PAST SURGICAL HISTORY  No past surgical history on file.      FAMILY HISTORY  No family history on file.      SOCIAL HISTORY  Social History     Socioeconomic History   • Marital status: Single     Spouse name: Not on file   • Number of children: Not on file   • Years of education: Not on file   • Highest education level: Not on file   Tobacco Use   • Smoking status: Never Smoker   • Smokeless tobacco: Never Used         ALLERGIES  Patient has no known allergies.        REVIEW OF SYSTEMS  Unobtainable secondary to altered mental status      PHYSICAL EXAM    I have reviewed the triage vital signs and nursing notes.    ED Triage Vitals   Temp Heart Rate Resp BP SpO2   07/06/21 0825 07/06/21 0821 07/06/21 0821 07/06/21 0821 07/06/21 0821   97.8 °F (36.6 °C) 73 10 (!) 173/113 97 %      Temp src Heart Rate Source Patient Position BP Location FiO2 (%)   07/06/21 0825 -- -- -- --   Tympanic           Physical Exam    GENERAL: Alert, mild distress, confused   HENT: head atraumatic, no nuchal rigidity  EYES: no scleral icterus, EOMI  CV: regular rhythm, regular rate, no murmur  RESPIRATORY: normal effort, CTA  ABDOMEN: soft, nontender  MUSCULOSKELETAL: no deformity, FROM, no calf swelling or tenderness  NEURO: alert, disoriented to time and place, 5/5 strength in all extremities, no facial droop.  Moderate expressive aphasia.  Patient unable to perform finger-to-nose secondary to confusion.  SKIN: warm, dry    NIH Stroke Scale: 6        LAB RESULTS  Recent Results (from the past 24 hour(s))   Comprehensive Metabolic Panel    Collection Time: 07/06/21  8:24 AM    Specimen: Blood   Result Value Ref Range    Glucose 118 (H) 65 - 99  mg/dL    BUN 13 8 - 23 mg/dL    Creatinine 0.55 (L) 0.57 - 1.00 mg/dL    Sodium 136 136 - 145 mmol/L    Potassium 3.2 (L) 3.5 - 5.2 mmol/L    Chloride 97 (L) 98 - 107 mmol/L    CO2 26.3 22.0 - 29.0 mmol/L    Calcium 9.8 8.6 - 10.5 mg/dL    Total Protein 7.6 6.0 - 8.5 g/dL    Albumin 4.40 3.50 - 5.20 g/dL    ALT (SGPT) 20 1 - 33 U/L    AST (SGOT) 17 1 - 32 U/L    Alkaline Phosphatase 61 39 - 117 U/L    Total Bilirubin 0.5 0.0 - 1.2 mg/dL    eGFR Non African Amer 109 >60 mL/min/1.73    Globulin 3.2 gm/dL    A/G Ratio 1.4 g/dL    BUN/Creatinine Ratio 23.6 7.0 - 25.0    Anion Gap 12.7 5.0 - 15.0 mmol/L   Troponin    Collection Time: 07/06/21  8:24 AM    Specimen: Blood   Result Value Ref Range    Troponin T <0.010 0.000 - 0.030 ng/mL   CBC Auto Differential    Collection Time: 07/06/21  8:24 AM    Specimen: Blood   Result Value Ref Range    WBC 6.23 3.40 - 10.80 10*3/mm3    RBC 4.63 3.77 - 5.28 10*6/mm3    Hemoglobin 13.7 12.0 - 15.9 g/dL    Hematocrit 40.6 34.0 - 46.6 %    MCV 87.7 79.0 - 97.0 fL    MCH 29.6 26.6 - 33.0 pg    MCHC 33.7 31.5 - 35.7 g/dL    RDW 12.1 (L) 12.3 - 15.4 %    RDW-SD 38.8 37.0 - 54.0 fl    MPV 9.6 6.0 - 12.0 fL    Platelets 296 140 - 450 10*3/mm3    Neutrophil % 47.8 42.7 - 76.0 %    Lymphocyte % 37.7 19.6 - 45.3 %    Monocyte % 8.2 5.0 - 12.0 %    Eosinophil % 5.0 0.3 - 6.2 %    Basophil % 1.1 0.0 - 1.5 %    Immature Grans % 0.2 0.0 - 0.5 %    Neutrophils, Absolute 2.98 1.70 - 7.00 10*3/mm3    Lymphocytes, Absolute 2.35 0.70 - 3.10 10*3/mm3    Monocytes, Absolute 0.51 0.10 - 0.90 10*3/mm3    Eosinophils, Absolute 0.31 0.00 - 0.40 10*3/mm3    Basophils, Absolute 0.07 0.00 - 0.20 10*3/mm3    Immature Grans, Absolute 0.01 0.00 - 0.05 10*3/mm3    nRBC 0.0 0.0 - 0.2 /100 WBC   COVID-19,[facility] RENU IN-HOUSE CEPHEID/JOVON NP SWAB IN TRANSPORT MEDIA 8-12 HR TAT - Swab, Nasopharynx    Collection Time: 07/06/21  8:52 AM    Specimen: Nasopharynx; Swab   Result Value Ref Range    COVID19 Not Detected Not  Detected - Ref. Range   ECG 12 Lead    Collection Time: 07/06/21 11:22 AM   Result Value Ref Range    QT Interval 447 ms   POC Glucose Once    Collection Time: 07/06/21 12:28 PM    Specimen: Blood   Result Value Ref Range    Glucose 122 70 - 130 mg/dL       Ordered the above labs and independently reviewed the results.        RADIOLOGY  CT Angiogram Neck, CT CEREBRAL PERFUSION WITH & WITHOUT CONTRAST, CT Angiogram Head w AI Analysis of LVO    Result Date: 7/6/2021    CT ANGIOGRAM NECK AND HEAD WITH CONTRAST AND CT PERFUSION WITH CONTRAST  HISTORY: Aphasia, confusion.  COMPARISON: None.  FINDINGS:  Initially, a noncontrasted CT examination of the brain was performed. The brain and ventricles are symmetrical. There is no evidence of hemorrhage, hydrocephalus or of abnormal extra-axial fluid. No focal area of decreased attenuation to suggest acute infarction was identified. The above information was called to and discussed with Dr. Arzate. Dr. Arzate requested further evaluation with CT perfusion and CT angiography.  CT PERFUSION: There is delayed perfusion involving the inferior division of the left MCA estimated to be approximately 33 cc in volume and without corresponding CBF abnormality.  CT ANGIOGRAM NECK AND HEAD WITH CONTRAST: A CT angiogram of the neck and head was performed. Multiplanar, as well as 3-dimensional reconstructions were generated.  FINDINGS: There is a bovine configuration to the great vessels. There is mild atherosclerotic disease appreciated involving the carotid bifurcations bilaterally, but with 0% stenosis using NASCET criteria. There is a weblike filling defect involving the proximal aspects of the internal carotid artery on the left posteriorly and medially. There is no evidence of dangling clot. The distal aspects of the internal carotid arteries and the proximal aspects of the anterior cerebral arteries and of the right middle cerebral artery appear unremarkable. There is a filling  defect appreciated involving the inferior division of the left middle cerebral artery. This involves a prominent branch within the anterior aspect of the sylvian fissure. No other filling defects are appreciated.  Both vertebral arteries were opacified. The left vertebral artery is slightly larger than that of the right. The basilar artery and right posterior cerebral artery appear unremarkable. There is a fetal origin of the left posterior cerebral artery.      1. There was no evidence of acute infarction or hemorrhage on the noncontrasted CT examination of the brain. 2. The CT perfusion did demonstrate 33 cc of delayed perfusion involving the inferior division of the left middle cerebral artery without corresponding CBF abnormality. 3. Clot was identified within the inferior division of the left MCA anteriorly within a prominent anterior sylvian branch. 4. There is mild irregularity involving the left internal carotid artery with an adjacent thin web. There is no evidence of dangling clot.  The noncontrasted CT examination of the brain was made available for interpretation at 0835 hours and results called to Dr. Arzate at 0836 hours. The CT angiogram was made available for interpretation at 0847 hours and results called to Dr. Arzate at 0850 hours.  AI analysis of LVO was utilized.  Radiation dose reduction techniques were utilized, including automated exposure control and exposure modulation based on body size.       MRI Brain Without Contrast    Result Date: 7/6/2021  LIMITED NONCONTRASTED MRI EXAMINATION OF THE BRAIN  HISTORY: Stroke.  TECHNIQUE: A limited MRI examination of the brain was performed utilizing axial diffusion, T2 FLAIR and gradient echo T2 imaging.  COMPARISON: CT angiogram 07/06/2021.  FINDINGS: The diffusion sequence demonstrates a curvilinear area of restricted diffusion involving the left parietal lobe superiolaterally, cortical and subcortical measuring 10 x 3 mm in size. A smaller  subcortical area of restricted diffusion involving the left occipital lobe posteriorly is appreciated measuring approximately 7 mm in size. There is subtle gyriform diffusion weighted hyperintensity involving the parieto-occipital region on the left posterior laterally. There is a questionable mild area of delayed perfusion involving the subcortical white matter of the frontoparietal region on the left superiorly measuring approximately 10 mm in AP dimension.  The T2 FLAIR sequence demonstrates increased signal intensity within branches of the left MCA consistent with slow flow. There is minimal diffusion hyperintensity related to the area of suspected acute infarction involving the left parietal lobe. There was no evidence of hemorrhage.      1. Small areas of restricted diffusion consistent with areas of infarction are noted, as well as subtle diffusion weighted hyperintensity involving the parieto-occipital cortex posterior laterally. There is only minimal diffusion hyperintensity appreciated. 2. There is increased signal intensity present within the branches of the inferior division of the left MCA consistent with slow flow evident on the axial T2 FLAIR sequence. The above information was discussed with Dr. Arzate while the patient was still in the MRI scanner.        I ordered the above noted radiological studies. Reviewed by me and discussed with radiologist.  See dictation for official radiology interpretation.      MEDICATIONS GIVEN IN ER    Medications   sodium chloride 0.9 % flush 10 mL (has no administration in time range)   sodium chloride 0.9 % flush 10 mL (10 mL Intravenous Not Given 7/6/21 1352)   sodium chloride 0.9 % flush 10 mL (has no administration in time range)   aspirin tablet 325 mg (has no administration in time range)     Or   aspirin suppository 300 mg (has no administration in time range)   atorvastatin (LIPITOR) tablet 80 mg (has no administration in time range)   sodium chloride 0.9 %  infusion (75 mL/hr Intravenous New Bag 7/6/21 1355)   Levothyroxine Sodium injection 50 mcg (50 mcg Intravenous Given 7/6/21 1525)   dextrose (GLUTOSE) oral gel 15 g (has no administration in time range)   dextrose (D50W) 25 g/ 50mL Intravenous Solution 25 g (has no administration in time range)   glucagon (human recombinant) (GLUCAGEN DIAGNOSTIC) injection 1 mg (has no administration in time range)   sodium chloride 0.9 % flush 10 mL (10 mL Intravenous Given 7/6/21 1526)   sodium chloride 0.9 % flush 10 mL (has no administration in time range)   acetaminophen (TYLENOL) tablet 650 mg (has no administration in time range)     Or   acetaminophen (TYLENOL) suppository 650 mg (has no administration in time range)   potassium chloride (K-DUR,KLOR-CON) ER tablet 40 mEq (has no administration in time range)     Or   potassium chloride (KLOR-CON) packet 40 mEq (has no administration in time range)     Or   potassium chloride 10 mEq in 100 mL IVPB (has no administration in time range)   Magnesium Sulfate 2 gram Bolus, followed by 8 gram infusion (total Mg dose 10 grams)- Mg less than or equal to 1mg/dL (has no administration in time range)     Or   Magnesium Sulfate 2 gram / 50mL Infusion (GIVE X 3 BAGS TO EQUAL 6GM TOTAL DOSE) - Mg 1.1 - 1.5 mg/dl (has no administration in time range)     Or   Magnesium Sulfate 4 gram infusion- Mg 1.6-1.9 mg/dL (has no administration in time range)   potassium phosphate 45 mmol in sodium chloride 0.9 % 500 mL infusion (has no administration in time range)     Or   potassium phosphate 30 mmol in sodium chloride 0.9 % 250 mL infusion (has no administration in time range)     Or   potassium phosphate 15 mmol in sodium chloride 0.9 % 100 mL infusion (has no administration in time range)     Or   sodium phosphates 40 mmol in sodium chloride 0.9 % 500 mL IVPB (has no administration in time range)     Or   sodium phosphates 20 mmol in sodium chloride 0.9 % 250 mL IVPB (has no administration in  time range)   calcium gluconate 1g/50ml 0.675% NaCl IV SOLN (has no administration in time range)     And   calcium gluconate 6 g in sodium chloride 0.9 % 500 mL IVPB (has no administration in time range)   enalaprilat (VASOTEC) injection 0.625 mg (has no administration in time range)   insulin lispro (ADMELOG) injection 0-9 Units (has no administration in time range)   sennosides-docusate (PERICOLACE) 8.6-50 MG per tablet 2 tablet (has no administration in time range)     And   polyethylene glycol (MIRALAX) packet 17 g (has no administration in time range)     And   bisacodyl (DULCOLAX) EC tablet 5 mg (has no administration in time range)     And   bisacodyl (DULCOLAX) suppository 10 mg (has no administration in time range)   ondansetron (ZOFRAN) tablet 4 mg (has no administration in time range)     Or   ondansetron (ZOFRAN) injection 4 mg (has no administration in time range)   iopamidol (ISOVUE-370) 76 % injection 150 mL (150 mL Intravenous Given by Other 7/6/21 0846)   alteplase (ACTIVASE) bolus from vial (7.26 mg Intravenous Given 7/6/21 0945)   alteplase (ACTIVASE) 100 mg kit (0 mg/kg × 80.7 kg Intravenous Stopped 7/6/21 1122)   sodium chloride 0.9 % infusion (100 mL/hr Intravenous New Bag 7/6/21 1121)   diphenhydrAMINE (BENADRYL) injection 50 mg (50 mg Intravenous Given 7/6/21 1110)   methylPREDNISolone sodium succinate (SOLU-Medrol) injection 125 mg (125 mg Intravenous Given 7/6/21 1110)     Critical Care  Performed by: John Ramsey PA  Authorized by: Brandin Peter MD     Critical care provider statement:     Critical care time (minutes):  33    Critical care time was exclusive of:  Separately billable procedures and treating other patients    Critical care was necessary to treat or prevent imminent or life-threatening deterioration of the following conditions:  CNS failure or compromise    Critical care was time spent personally by me on the following activities:  Blood draw for specimens,  discussions with consultants, development of treatment plan with patient or surrogate, evaluation of patient's response to treatment, examination of patient, interpretation of cardiac output measurements, obtaining history from patient or surrogate, ordering and performing treatments and interventions, ordering and review of laboratory studies, ordering and review of radiographic studies, pulse oximetry, re-evaluation of patient's condition and review of old charts        PROGRESS, DATA ANALYSIS, CONSULTS, AND MEDICAL DECISION MAKING    All labs have been independently reviewed by me.  All radiology studies have been reviewed by me and discussed with radiologist dictating the report.   EKG's independently viewed and interpreted by me.  Discussion below represents my analysis of pertinent findings related to patient's condition, differential diagnosis, treatment plan and final disposition.    I have discussed case with Dr. Peter, emergency room physician.  He has performed his own bedside examination and agrees with treatment plan.    ED Course as of Jul 06 1530   Tue Jul 06, 2021   0820 Patient presents with aphasia, confusion with unknown onset.  Patient last seen normal at 10 PM last night.  Team D called.  NIH stroke scale 6.  Patient is not on any blood thinners.    [EE]   0823 I discussed case with Dr. Arzate, stroke neurologist.  He would like me to order Team D CT evaluation.    [EE]   0826 Dr. Arzate at bedside.  CT scan does show left temporal lobe stroke.  It is not a retrievable clot.  Patient being sent over for wake-up MRI. Pt is a potential TPA candidate based on MRI findings.      [EE]   0903 Hemoglobin: 13.7 [EE]   0903 WBC: 6.23 [EE]   0903 Creatinine(!): 0.55 [EE]   0930 I updated patient's family on plan.  They are in agreement with possible TPA.    [EE]   0946 Dr. Thornton called from MRI.  Patient to be given TPA.  He would like patient admitted to the ICU.    [EE]   1100 Patient with mild  urticarial rash over her chest.  IV Benadryl ordered.    [EE]   1126 EKG independently viewed and contemporaneously interpreted by ED physician. Time: 11:22 AM.  Rate 77.  Interpretation: Normal sinus rhythm, normal axis, inferior Q waves, delayed R wave progression, no acute ST changes, prolonged QTC.    [JG]   1143 I discussed case with Dr. Gautam, intensivist.  He agrees to admit the patient to the ICU.    [EE]      ED Course User Index  [EE] John Ramsey PA  [JG] Brandin Peter MD       AS OF 15:30 EDT VITALS:    BP - 123/88  HR - 94  TEMP - 98.4 °F (36.9 °C) (Oral)  O2 SATS - 94%        DIAGNOSIS  Final diagnoses:   Acute CVA (cerebrovascular accident) (CMS/[facility])         DISPOSITION  Admitted           John Ramsey PA  07/06/21 1531      Electronically signed by John Ramsey PA at 07/06/21 1531     Brandin Peter MD at 07/06/21 0835          MD ATTESTATION NOTE  Patient was placed in face mask in first look and the following protective measures were taken unless additional measures were taken and documented below in the ED course. Patient was wearing facemask when I entered the room and throughout our encounter. I wore full protective equipment throughout this patient encounter including a face mask, and gloves. Hand hygiene was performed before donning protective equipment and after removal when leaving the room.    The HAYDEN and I have discussed this patient's history, physical exam, and treatment plan. I have reviewed the documentation and personally had a face to face interaction with the patient. I affirm the HAYDEN documentation and agree with their diagnostics, findings, treatment, plan, and disposition.  The attached note describes my personal findings.    Ambar Bingham is a 70 y.o. female who presents to the ED c/o aphasia.  Patient aphasic, history limited.  History supplied by EMS.  Per EMS, patient was last seen normal at 10 PM last night, found by son this morning, confused, difficulty  speaking.    On exam:  General: NAD  Head: NCAT  ENT: Extraocular motion intact, pupils equal and round reactive to light, moist mucous membranes  Neck: Supple, trachea midline.  Cardiac: regular rate and rhythm  Lungs: Clear to auscultation bilaterally  Abdomen: Soft, nontender, no rebound tenderness/guarding/rigidity  : Deferred to HAYDEN  Extremities: Moves all extremities well, no peripheral edema  Neuro: Alert, unable to assess orientation secondary to moderate expressive aphasia, extraocular motion intact, no facial droop, moderate expressive aphasia, no slurred speech, 5 out of 5 strength all extremities, sensation intact light touch, intermittently follows commands, neuro exam limited secondary to patient altered mental status and noncompliance.  Skin: Warm, dry.    Medical Decision Making:  After the initial H&P, I discussed pertinent information from history and physical exam with patient/family.  Discussed differential diagnosis.  Discussed plan for ED evaluation/work-up/treatment.  All questions answered.  Patient/family is agreeable with plan.    ED Course as of Jul 06 1439   Tue Jul 06, 2021   0820 Patient presents with aphasia, confusion with unknown onset.  Patient last seen normal at 10 PM last night.  Team D called.  NIH stroke scale 6.  Patient is not on any blood thinners.    [EE]   0823 I discussed case with Dr. Arzate, stroke neurologist.  He would like me to order Team D CT evaluation.    [EE]   0826 Dr. Arzate at bedside.  CT scan does show left temporal lobe stroke.  It is not a retrievable clot.  Patient being sent over for wake-up MRI. Pt is a potential TPA candidate based on MRI findings.      [EE]   0903 Hemoglobin: 13.7 [EE]   0903 WBC: 6.23 [EE]   0903 Creatinine(!): 0.55 [EE]   0930 I updated patient's family on plan.  They are in agreement with possible TPA.    [EE]   0946 Dr. Thornton called from MRI.  Patient to be given TPA.  He would like patient admitted to the ICU.    [EE]    1100 Patient with mild urticarial rash over her chest.  IV Benadryl ordered.    [EE]   1126 EKG independently viewed and contemporaneously interpreted by ED physician. Time: 11:22 AM.  Rate 77.  Interpretation: Normal sinus rhythm, normal axis, inferior Q waves, delayed R wave progression, no acute ST changes, prolonged QTC.    [JG]   1143 I discussed case with Dr. Gautam, intensivist.  He agrees to admit the patient to the ICU.    [EE]      ED Course User Index  [EE] John Ramsey PA  [JG] Brandin Peter MD       Diagnosis  Final diagnoses:   Acute CVA (cerebrovascular accident) (CMS/Formerly [facility])        Brandin Peter MD  07/06/21 1439      Electronically signed by Brandin Peter MD at 07/06/21 1439     Yanet Kemp, RN at 07/06/21 1058        Rash noted to pt's face and chest. Dr. Thornton called and notified. Verbal order received for 50 mg of benadryl and 125 mg of solumedrol.      Yanet Kemp, RN  07/06/21 1121      Electronically signed by Yanet Kemp, RN at 07/06/21 1121       Vital Signs (last day)     Date/Time   Temp   Temp src   Pulse   Resp   BP   Patient Position   SpO2    07/07/21 1200   --   --   87   16   162/83   Lying   99    07/07/21 1111   98.2 (36.8)   --   --   --   --   --   --    07/07/21 1100   --   --   76   18   148/76   Lying   98    07/07/21 1049   98 (36.7)   --   --   --   --   --   --    07/07/21 1045   --   --   88   18   155/79   Lying   93    07/07/21 0930   --   --   80   16   143/78   Lying   95    07/07/21 0900   --   --   79   16   141/80   Lying   98    07/07/21 0830   --   --   87   --   148/91   --   95    07/07/21 0800   --   --   81   16   148/81   Lying   98    07/07/21 0708   97.7 (36.5)   --   --   --   --   --   --    07/07/21 0704   --   --   --   --   --   --   95 07/07/21 0700   --   --   71   --   127/72   --   100    07/07/21 0630   --   --   72   --   129/70   --   94    07/07/21 0600   --   --   74   --   127/67   --   95 07/07/21  0530   --   --   82   --   116/72   --   97    07/07/21 0500   --   --   72   --   104/83   --   96    07/07/21 0430   --   --   74   --   118/68   --   95    07/07/21 0400   --   --   73   --   121/84   --   96    07/07/21 0330   --   --   90   --   138/75   --   92    07/07/21 0300   --   --   74   --   123/64   --   93    07/07/21 0230   --   --   82   --   105/56   --   94    07/07/21 0200   --   --   78   --   142/46   --   93    07/07/21 0130   --   --   79   --   127/60   --   93    07/07/21 0100   --   --   80   --   109/63   --   93    07/07/21 0030   --   --   81   --   112/65   --   94    07/07/21 0000   --   --   81   --   120/68   --   94    07/06/21 2330   --   --   82   --   127/77   --   95    07/06/21 2300   98 (36.7)   Oral   84   --   124/69   --   92    07/06/21 2230   --   --   86   --   128/72   --   92    07/06/21 2200   --   --   82   --   116/72   --   (!) 89    07/06/21 2130   --   --   82   --   119/72   --   93    07/06/21 2100   --   --   87   --   123/71   --   93    07/06/21 2030   --   --   94   --   129/71   --   93    07/06/21 2000   --   --   86   --   135/79   --   91    07/06/21 1930   --   --   94   --   138/83   --   91    07/06/21 1900   --   --   90   14   141/79   --   92    07/06/21 1830   --   --   --   16   --   --   --    07/06/21 1800   --   --   98   16   141/73   Lying   93    07/06/21 1730   --   --   --   16   --   --   --    07/06/21 1700   --   --   95   16   142/91   --   92    07/06/21 1630   --   --   95   14   135/95   --   --    07/06/21 1600   --   --   88   14   142/78   --   91    07/06/21 1530   --   --   91   14   147/60   --   95    07/06/21 1500   --   --   85   14   144/78   --   93    07/06/21 1430   --   --   --   16   123/88   --   --    07/06/21 1400   --   --   94   16   162/88   --   94    07/06/21 1330   --   --   79   16   151/97   --   92    07/06/21 1300   --   --   80   14   147/86   --   94    07/06/21 1215   98.4 (36.9)   Oral   77   14    147/86   Lying   96    07/06/21 1200   --   --   77   --   145/85   --   92    07/06/21 1145   --   --   --   15   --   --   --    07/06/21 1130   --   --   --   16   --   --   --    07/06/21 1115   --   --   --   16   --   --   98    07/06/21 1100   --   --   --   15   --   --   98    07/06/21 1045   --   --   --   15   148/81   --   --    07/06/21 1030   --   --   --   16   124/86   --   --    07/06/21 1015   --   --   --   15   146/93   --   --    07/06/21 1000   --   --   --   15   148/77   --   97    07/06/21 0942   --   --   --   --   140/68   --   --    07/06/21 0940   --   --   72   16   148/77   --   97    07/06/21 0935   --   --   76   16   154/81   --   97    07/06/21 0930   --   --   76   16   139/69   --   95    07/06/21 0925   --   --   77   16   160/81   --   96    07/06/21 0900   --   --   79   16   (!) 181/95   --   --    07/06/21 0825   97.8 (36.6)   Tympanic   --   --   --   --   --    07/06/21 0821   --   --   73   10   (!) 173/113   --   97              Oxygen Therapy (last day)     Date/Time   SpO2   Device (Oxygen Therapy)   Flow (L/min)   Oxygen Concentration (%)   ETCO2 (mmHg)    07/07/21 1200   99   room air   --   --   --    07/07/21 1100   98   room air   --   --   --    07/07/21 1045   93   room air   --   --   --    07/07/21 0930   95   nasal cannula   2   --   --    07/07/21 0900   98   nasal cannula   2   --   --    07/07/21 0830   95   --   --   --   --    07/07/21 0800   98   nasal cannula   2   --   --    07/07/21 0704   95   nasal cannula   2   --   --    07/07/21 0700   100   --   --   --   --    07/07/21 0630   94   --   --   --   --    07/07/21 0600   95   --   --   --   --    07/07/21 0530   97   --   --   --   --    07/07/21 0500   96   --   --   --   --    07/07/21 0430   95   --   --   --   --    07/07/21 0400   96   --   --   --   --    07/07/21 0330   92   --   --   --   --    07/07/21 0300   93   --   --   --   --    07/07/21 0230   94   --   --   --   --    07/07/21  0200   93   --   --   --   --    07/07/21 0130   93   --   --   --   --    07/07/21 0100   93   --   --   --   --    07/07/21 0030   94   --   --   --   --    07/07/21 0000   94   nasal cannula   2   --   --    07/06/21 2330   95   --   --   --   --    07/06/21 2300   92   --   --   --   --    07/06/21 2230   92   --   --   --   --    07/06/21 2200   (!) 89   --   --   --   --    07/06/21 2130   93   --   --   --   --    07/06/21 2100   93   --   --   --   --    07/06/21 2030   93   --   --   --   --    07/06/21 2000 91   --   --   --   --    07/06/21 1930   91   nasal cannula   2   --   --    07/06/21 1900   92   room air   --   --   --    07/06/21 1830   --   room air   --   --   --    07/06/21 1800   93   room air   --   --   --    07/06/21 1730   --   room air   --   --   --    07/06/21 1700   92   room air   --   --   --    07/06/21 1630   --   room air   --   --   --    07/06/21 1600   91   room air   --   --   --    07/06/21 1530   95   room air   --   --   --    07/06/21 1500   93   room air   --   --   --    07/06/21 1430   --   room air   --   --   --    07/06/21 1400   94   room air   --   --   --    07/06/21 1330   92   room air   --   --   --    07/06/21 1326   --   room air   --   --   --    07/06/21 1300   94   room air   --   --   --    07/06/21 1215   96   room air   --   --   --    07/06/21 1200   92   --   --   --   --    07/06/21 1145   --   room air   --   --   --    07/06/21 1130   --   room air   --   --   --    07/06/21 1115   98   room air   --   --   --    07/06/21 1100   98   room air   --   --   --    07/06/21 1045   --   room air   --   --   --    07/06/21 1030   --   room air   --   --   --    07/06/21 1015   --   room air   --   --   --    07/06/21 1000   97   room air   --   --   --    07/06/21 0940   97   --   --   --   --    07/06/21 0935   97   --   --   --   --    07/06/21 0930   95   --   --   --   --    07/06/21 0925   96   --   --   --   --    07/06/21 0821   97   room air    --   --   --                  Facility-Administered Medications as of 7/7/2021   Medication Dose Route Frequency Provider Last Rate Last Admin   • acetaminophen (TYLENOL) tablet 650 mg  650 mg Oral Q4H PRN Skinny Gautam MD        Or   • acetaminophen (TYLENOL) suppository 650 mg  650 mg Rectal Q4H PRN Skinny Gautam MD       • [COMPLETED] alteplase (ACTIVASE) 100 mg kit  0.81 mg/kg Intravenous Once Cesar Arzate MD   Stopped at 07/06/21 1122   • [COMPLETED] alteplase (ACTIVASE) bolus from vial  0.09 mg/kg Intravenous Once Cesar Arzate MD   7.26 mg at 07/06/21 0945   • aspirin tablet 325 mg  325 mg Oral Daily Cesar Arzate MD   325 mg at 07/07/21 1121    Or   • aspirin suppository 300 mg  300 mg Rectal Daily Cesar Arzate MD       • atorvastatin (LIPITOR) tablet 80 mg  80 mg Oral Nightly Cesar Arzate MD   80 mg at 07/06/21 2029   • sennosides-docusate (PERICOLACE) 8.6-50 MG per tablet 2 tablet  2 tablet Oral BID Skinny Gautam MD   2 tablet at 07/07/21 1121    And   • polyethylene glycol (MIRALAX) packet 17 g  17 g Oral Daily PRN Skinny Gautam MD        And   • bisacodyl (DULCOLAX) EC tablet 5 mg  5 mg Oral Daily PRN Skinny Gautam MD        And   • bisacodyl (DULCOLAX) suppository 10 mg  10 mg Rectal Daily PRN Skinny Gautam MD       • calcium gluconate 1g/50ml 0.675% NaCl IV SOLN  1 g Intravenous PRN Skinny Gautam MD        And   • calcium gluconate 6 g in sodium chloride 0.9 % 500 mL IVPB  6 g Intravenous PRN Skinny Gautam MD       • dextrose (D50W) 25 g/ 50mL Intravenous Solution 25 g  25 g Intravenous Q15 Min PRN Skinny Gautam MD       • dextrose (GLUTOSE) oral gel 15 g  15 g Oral Q15 Min PRN Skinny Gautam MD       • [COMPLETED] diphenhydrAMINE (BENADRYL) injection 50 mg  50 mg Intravenous Once Cesar Arzate MD   50 mg at 07/06/21 1110   • enalaprilat (VASOTEC) injection 0.625 mg  0.625 mg Intravenous Q6H PRN  Skinny Gautam MD       • glucagon (human recombinant) (GLUCAGEN DIAGNOSTIC) injection 1 mg  1 mg Subcutaneous Q15 Min PRN Skinny Gautam MD       • insulin lispro (ADMELOG) injection 0-9 Units  0-9 Units Subcutaneous Q6H Skinny Gautam MD   2 Units at 07/07/21 0603   • [COMPLETED] iopamidol (ISOVUE-370) 76 % injection 150 mL  150 mL Intravenous Once in imaging Brandin Peter MD   150 mL at 07/06/21 0846   • [START ON 7/8/2021] levothyroxine (SYNTHROID, LEVOTHROID) tablet 100 mcg  100 mcg Oral Q AM Skinny Gautam MD       • Magnesium Sulfate 2 gram Bolus, followed by 8 gram infusion (total Mg dose 10 grams)- Mg less than or equal to 1mg/dL  2 g Intravenous PRN Skinny Gautam MD        Or   • Magnesium Sulfate 2 gram / 50mL Infusion (GIVE X 3 BAGS TO EQUAL 6GM TOTAL DOSE) - Mg 1.1 - 1.5 mg/dl  2 g Intravenous PRN Skinny Gautam MD        Or   • Magnesium Sulfate 4 gram infusion- Mg 1.6-1.9 mg/dL  4 g Intravenous PRN Skinny Gautam MD       • [COMPLETED] methylPREDNISolone sodium succinate (SOLU-Medrol) injection 125 mg  125 mg Intravenous Once Cesar Arzate MD   125 mg at 07/06/21 1110   • ondansetron (ZOFRAN) tablet 4 mg  4 mg Oral Q6H PRN Skinny Gautam MD        Or   • ondansetron (ZOFRAN) injection 4 mg  4 mg Intravenous Q6H PRN Skinny Gautam MD       • potassium chloride (K-DUR,KLOR-CON) ER tablet 40 mEq  40 mEq Oral PRN Skinny Gautam MD        Or   • potassium chloride (KLOR-CON) packet 40 mEq  40 mEq Oral PRN Skinny Gautam MD   40 mEq at 07/07/21 0603    Or   • potassium chloride 10 mEq in 100 mL IVPB  10 mEq Intravenous Q1H PRN Skinny Gautam MD       • potassium phosphate 45 mmol in sodium chloride 0.9 % 500 mL infusion  45 mmol Intravenous PRN Skinny Gautam MD        Or   • potassium phosphate 30 mmol in sodium chloride 0.9 % 250 mL infusion  30 mmol Intravenous PRN Skinny Gautam MD        Or   • potassium phosphate 15 mmol in sodium chloride 0.9 % 100 mL  infusion  15 mmol Intravenous PRN Skinny Gautam MD        Or   • sodium phosphates 40 mmol in sodium chloride 0.9 % 500 mL IVPB  40 mmol Intravenous PRN Skinny Gautam MD        Or   • sodium phosphates 20 mmol in sodium chloride 0.9 % 250 mL IVPB  20 mmol Intravenous PRN Skinny Gautam MD       • sodium chloride 0.9 % flush 10 mL  10 mL Intravenous PRN John Ramsey PA       • sodium chloride 0.9 % flush 10 mL  10 mL Intravenous Q12H Cesar Arzate MD   10 mL at 07/07/21 1011   • sodium chloride 0.9 % flush 10 mL  10 mL Intravenous PRN Cesar Arzate MD       • sodium chloride 0.9 % flush 10 mL  10 mL Intravenous Q12H Skinny Gautam MD   10 mL at 07/07/21 1011   • sodium chloride 0.9 % flush 10 mL  10 mL Intravenous PRN Skinny Gautam MD       • [COMPLETED] sodium chloride 0.9 % infusion  100 mL/hr Intravenous Once Cesar Arzate  mL/hr at 07/06/21 1121 100 mL/hr at 07/06/21 1121   • sodium chloride 0.9 % infusion  75 mL/hr Intravenous Continuous Cesar Arzate MD 75 mL/hr at 07/06/21 2330 75 mL/hr at 07/06/21 2330     Orders (last 24 hrs)      Start     Ordered    07/08/21 0600  levothyroxine (SYNTHROID, LEVOTHROID) tablet 100 mcg  Every Early Morning      07/07/21 1326    07/07/21 1116  POC Glucose Once  Once      07/07/21 1112    07/07/21 0947  aspirin tablet 325 mg  Daily      07/06/21 0947    07/07/21 0947  aspirin suppository 300 mg  Daily      07/06/21 0947    07/07/21 0800  CT Head Without Contrast  1 Time Imaging      07/06/21 0947    07/07/21 0730  Adult Transthoracic Echo Complete W/ Cont if Necessary Per Protocol  Once      07/07/21 0730    07/07/21 0600  Hemoglobin A1c  Morning Draw      07/06/21 0947    07/07/21 0600  Lipid Panel  Morning Draw      07/06/21 0947    07/07/21 0600  CBC & Differential  Morning Draw      07/06/21 1343    07/07/21 0600  Comprehensive Metabolic Panel  Morning Draw      07/06/21 1343    07/07/21 0600  CBC Auto  Differential  PROCEDURE ONCE      07/06/21 2205 07/06/21 2327  POC Glucose Once  Once      07/06/21 2325    07/06/21 2100  atorvastatin (LIPITOR) tablet 80 mg  Nightly      07/06/21 0947    07/06/21 2100  sennosides-docusate (PERICOLACE) 8.6-50 MG per tablet 2 tablet  2 Times Daily      07/06/21 1343    07/06/21 1800  POC Glucose Q6H  Every 6 Hours      07/06/21 1343    07/06/21 1749  POC Glucose Once  Once      07/06/21 1746    07/06/21 1535  Diet Regular; Cardiac, Consistent Carbohydrate  Diet Effective Now      07/06/21 1535    07/06/21 1531  Critical Care  Once     Comments: This order was created via procedure documentation    07/06/21 1530    07/06/21 1430  Levothyroxine Sodium injection 50 mcg  Daily at 1100,   Status:  Discontinued      07/06/21 1333    07/06/21 1430  sodium chloride 0.9 % flush 10 mL  Every 12 Hours Scheduled      07/06/21 1343    07/06/21 1430  insulin lispro (ADMELOG) injection 0-9 Units  Every 6 Hours      07/06/21 1343    07/06/21 1344  Daily Weights  Daily      07/06/21 1343    07/06/21 1344  Patient Currently On Electrolyte Replacement Protocol - Please Refer to MAR for Protocol Details  Misc Nursing Order (Specify)  Daily     Comments: Patient Currently On Electrolyte Replacement Protocol - Please Refer to MAR for Protocol Details    07/06/21 1343    07/06/21 1343  ondansetron (ZOFRAN) tablet 4 mg  Every 6 Hours PRN      07/06/21 1343    07/06/21 1343  ondansetron (ZOFRAN) injection 4 mg  Every 6 Hours PRN      07/06/21 1343    07/06/21 1343  bisacodyl (DULCOLAX) suppository 10 mg  Daily PRN      07/06/21 1343    07/06/21 1343  polyethylene glycol (MIRALAX) packet 17 g  Daily PRN      07/06/21 1343    07/06/21 1343  bisacodyl (DULCOLAX) EC tablet 5 mg  Daily PRN      07/06/21 1343    07/06/21 1343  enalaprilat (VASOTEC) injection 0.625 mg  Every 6 Hours PRN      07/06/21 1343    07/06/21 1343  calcium gluconate 1g/50ml 0.675% NaCl IV SOLN  As Needed      07/06/21 1343    07/06/21  1343  calcium gluconate 6 g in sodium chloride 0.9 % 500 mL IVPB  As Needed     Note to Pharmacy: Final concentration of bolus dose between 10 to 50 mg/mL.    07/06/21 1343    07/06/21 1342  potassium phosphate 45 mmol in sodium chloride 0.9 % 500 mL infusion  As Needed      07/06/21 1343    07/06/21 1342  potassium phosphate 30 mmol in sodium chloride 0.9 % 250 mL infusion  As Needed      07/06/21 1343    07/06/21 1342  potassium phosphate 15 mmol in sodium chloride 0.9 % 100 mL infusion  As Needed      07/06/21 1343    07/06/21 1342  sodium phosphates 40 mmol in sodium chloride 0.9 % 500 mL IVPB  As Needed      07/06/21 1343    07/06/21 1342  sodium phosphates 20 mmol in sodium chloride 0.9 % 250 mL IVPB  As Needed      07/06/21 1343    07/06/21 1334  Magnesium Sulfate 2 gram Bolus, followed by 8 gram infusion (total Mg dose 10 grams)- Mg less than or equal to 1mg/dL  As Needed      07/06/21 1343    07/06/21 1334  Magnesium Sulfate 2 gram / 50mL Infusion (GIVE X 3 BAGS TO EQUAL 6GM TOTAL DOSE) - Mg 1.1 - 1.5 mg/dl  As Needed      07/06/21 1343    07/06/21 1334  Magnesium Sulfate 4 gram infusion- Mg 1.6-1.9 mg/dL  As Needed      07/06/21 1343    07/06/21 1334  potassium chloride (K-DUR,KLOR-CON) ER tablet 40 mEq  As Needed      07/06/21 1343    07/06/21 1334  potassium chloride (KLOR-CON) packet 40 mEq  As Needed      07/06/21 1343    07/06/21 1334  potassium chloride 10 mEq in 100 mL IVPB  Every 1 Hour PRN      07/06/21 1343    07/06/21 1334  acetaminophen (TYLENOL) tablet 650 mg  Every 4 Hours PRN      07/06/21 1343    07/06/21 1334  acetaminophen (TYLENOL) suppository 650 mg  Every 4 Hours PRN      07/06/21 1343    07/06/21 1334  Intake and Output  Every Shift      07/06/21 1343    07/06/21 1333  sodium chloride 0.9 % flush 10 mL  As Needed      07/06/21 1343    07/06/21 1333  dextrose (GLUTOSE) oral gel 15 g  Every 15 Minutes PRN      07/06/21 1343    07/06/21 1333  dextrose (D50W) 25 g/ 50mL Intravenous  Solution 25 g  Every 15 Minutes PRN      07/06/21 1343    07/06/21 1333  glucagon (human recombinant) (GLUCAGEN DIAGNOSTIC) injection 1 mg  Every 15 Minutes PRN      07/06/21 1343    07/06/21 1200  Intake and Output  Every 4 Hours      07/06/21 0947    07/06/21 1200  POC Glucose Q6H  Every 6 Hours,   Status:  Canceled     Comments: May Discontinue After 2 Consecutive Readings Less Than 140Notify Provider if 2 Readings Greater Than 140      07/06/21 0947    07/06/21 0949  sodium chloride 0.9 % flush 10 mL  Every 12 Hours Scheduled      07/06/21 0947    07/06/21 0949  sodium chloride 0.9 % infusion  Continuous      07/06/21 0947    07/06/21 0947  NIHSS Assessment  Every Shift     Comments: Turn off all sedation medications prior to performing assessment. Assessment to be performed upon admission, transfer to another unit, discharge, and with neurological decline. If NIHSS change is greater than or equal to 4 and/or neurological decline is noted notify physician.    07/06/21 0947    07/06/21 0946  sodium chloride 0.9 % flush 10 mL  As Needed      07/06/21 0947    07/06/21 0820  sodium chloride 0.9 % flush 10 mL  As Needed      07/06/21 0823    Unscheduled  Order CT Head Without Contrast for Neurological Decline  As Needed      07/06/21 0947    Unscheduled  ECG 12 Lead  As Needed     Comments: For ICU/CCU Protocol Orders.  Nurse to Release if Patient Experiences Acute Chest Pain or Dysrhythmias    07/06/21 1343    Unscheduled  Potassium  As Needed     Comments: Sudden Ventricular Dysrhythmias. Notify Physician.      07/06/21 1343    Unscheduled  Magnesium  As Needed     Comments: For ICU/CCU Protocol      07/06/21 1343    Unscheduled  Magnesium  As Needed      07/06/21 1343    Unscheduled  Potassium  As Needed      07/06/21 1343    Unscheduled  Calcium  As Needed     Comments: 6 hours after infusion complete      07/06/21 1343    --  amLODIPine (NORVASC) 2.5 MG tablet  Daily      07/06/21 1030    --  FLUoxetine (PROzac)  "20 MG capsule  Daily      21 103    --  levothyroxine (SYNTHROID, LEVOTHROID) 100 MCG tablet  Daily      21    --  lisinopril-hydrochlorothiazide (PRINZIDE,ZESTORETIC) 20-12.5 MG per tablet  Daily      21    --  metFORMIN (GLUCOPHAGE) 1000 MG tablet  2 Times Daily With Meals      21    --  solifenacin (VESICARE) 5 MG tablet  Daily      21    --  simvastatin (ZOCOR) 40 MG tablet  Nightly      21    --  Vitamin D, Cholecalciferol, 50 MCG (2000) capsule  Daily      21 103                   Physician Progress Notes       Cesar Arzate MD at 21 0940          DOS: 2021  NAME: Ambar Bingham   : 1950  PCP: Provider, No Known  Chief Complaint   Patient presents with   • Neuro Deficit(s)       Chief complaint: stroke  Subjective: Persistent receptive aphasia today.  No significant neurologic change.  No acute events    Objective:  Vital signs: /80 (BP Location: Left arm, Patient Position: Lying)   Pulse 79   Temp 97.7 °F (36.5 °C)   Resp 16   Ht 165.1 cm (65\")   Wt 80.3 kg (177 lb 0.5 oz)   SpO2 98%   BMI 29.46 kg/m²    Gen: NAD, vitals reviewed  MS: oriented x3, recent/remote memory intact, normal attention/concentration, receptive aphasia and anomia, no neglect.  CN: visual acuity grossly normal, PERRL, EOMI, no facial droop, no dysarthria  Motor: 5/5 throughout upper and lower extremities, normal tone  Sensory: intact to light touch all 4 ext.    ROS:  No weakness, numbness  No fevers, chills      Laboratory results:  Lab Results   Component Value Date    GLUCOSE 158 (H) 2021    CALCIUM 9.3 2021     2021    K 3.3 (L) 2021    CO2 25.1 2021    CL 99 2021    BUN 15 2021    CREATININE 0.58 2021    EGFRIFNONA 103 2021    BCR 25.9 (H) 2021    ANIONGAP 11.9 2021     Lab Results   Component Value Date    WBC 10.83 (H) 2021    HGB 13.8 " 07/07/2021    HCT 41.2 07/07/2021    MCV 90.4 07/07/2021     07/07/2021     Lab Results   Component Value Date    LDL 69 07/07/2021    LDL 51 05/26/2020    LDL 40 03/11/2019         Lab 07/07/21  0340   HEMOGLOBIN A1C 6.20*        Review of labs: Glucose 158, LDL 69    Review and interpretation of imaging: Follow-up head CT pending.  CTA head and neck reviewed in detail.  She has a small left carotid stenosis but no plaque.  Radiologist noted a small weblike area in the left carotid.  No thrombus seen.  Radiology report reviewed.    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  Status post TPA.    MRI 7/6: Patchy left temporoparietal infarcts  CTA: Weblike area in the left carotid but no thrombus or plaque seen  2D echocardiogram: Pending    Diagnoses:  Stroke, left middle cerebral artery, embolic  Cryptogenic stroke  Receptive aphasia    Comment: Embolic left MCA stroke status post TPA.  Not much clinical improvement so far.  2D echo pending.  Anticipate pursuing MINDY or potentially long-term cardiac monitoring    Plan:  1.  Aspirin, statin after 24 hours post TPA head CT  2.  24-hour head CT this morning  3.  2D echocardiogram    Discussed with patient, family at the bedside.  Continue to monitor in ICU today      Electronically signed by Cesar Arzate MD at 07/07/21 0943     Skinny Gautam MD at 07/07/21 0712          [facility]     Mar/chart reviewed  Follow up Acute CVA  Some continue aphasia limits subjective    Vital Sign Min/Max for last 24 hours  Temp  Min: 97.7 °F (36.5 °C)  Max: 98.4 °F (36.9 °C)   BP  Min: 104/83  Max: 181/95   Pulse  Min: 71  Max: 98   Resp  Min: 10  Max: 16   SpO2  Min: 89 %  Max: 100 %   Flow (L/min)  Min: 2  Max: 2   Weight  Min: 80.3 kg (177 lb 0.5 oz)  Max: 80.7 kg (178 lb)   2001/1050  Appears ill, alert, unable to assess orientation   perrl, eomi, normal sclera,   mmm, no jvd, trachea midline, neck  supple,  chest cta bilaterally, no crackles, no wheezes,   rrr,   soft, nt, nd +bs,  no c/c/ e  Skin warm, dry no rashes    Labs: 7/7: reviewed:  ldl 69  Triglycerides 73  Glucose 158  Bun 15  Cr 0.58  Na 136  k 3.3  biarb 25  Wbc 10.8  hgb 13.8  plts 338    7/6: mri brain:   1. Small areas of restricted diffusion consistent with areas of   infarction are noted, as well as subtle diffusion weighted   hyperintensity involving the parieto-occipital cortex posterior   laterally. There is only minimal diffusion hyperintensity appreciated.   2. There is increased signal intensity present within the branches of   the inferior division of the left MCA consistent with slow flow evident   on the axial T2 FLAIR sequence    1. Acute CVA s/p TPA -- continue care as per protocol, neuro checks  bp control. Risk factor modification, will need 2d echo.  2. HTN -- goal bp less than 180/105; iv meds as needed  3. HLD --statin  4. Hypokalemia -- replace  5. DM -- ssi  6. Depression - can resume ssri when able to take po  7. Hypothyroidism -- continue replacement    Electronically signed by Skinny Gautam MD at 07/07/21 0799          Consult Notes       Cesar Arzate MD at 07/06/21 0900          Neurology Consult Note    Consult Date: 7/6/2021    Referring MD: Dr. Peter    Reason for Consult I have been asked to see the patient in neurological consultation to render advice and opinion regarding acute stroke    Ambar Bingham is a 70 y.o. female with past medical history of hypertension and diabetes who presented to the hospital with last known normal at approximately 10 PM last night.  She was found by her son today at 7:30 AM with aphasia.  She presented to the emergency department with aphasia no other focal deficits.  Team D imaging was performed CT head CTA and CT perfusion which revealed a large area of tissue at risk in the left temporal lobe.  Her deficits have not improved since arrival.  She was taken for team D  MRI which showed areas of diffusion restriction with no significant T2 flair correlation.  tPA was given.    Past medical history: Essential hypertension, diabetes    ROS: Obtained from family  No fevers, chills  No weakness, numbness , + aphasia    Exam  BP (!) 181/95   Pulse 79   Temp 97.8 °F (36.6 °C) (Tympanic)   Resp 16   Wt 80.7 kg (178 lb)   SpO2 97%   Gen: NAD, vitals reviewed  MS: Awake, alert, receptive aphasia, no neglect, following commands intermittently  CN: visual acuity grossly normal, PERRL, EOMI, no facial droop, no dysarthria  Motor: 5/5 throughout upper and lower extremities, normal tone    DATA:    Lab Results   Component Value Date    GLUCOSE 118 (H) 07/06/2021    CALCIUM 9.8 07/06/2021     07/06/2021    K 3.2 (L) 07/06/2021    CO2 26.3 07/06/2021    CL 97 (L) 07/06/2021    BUN 13 07/06/2021    CREATININE 0.55 (L) 07/06/2021    EGFRIFNONA 109 07/06/2021    BCR 23.6 07/06/2021    ANIONGAP 12.7 07/06/2021     Lab Results   Component Value Date    WBC 6.23 07/06/2021    HGB 13.7 07/06/2021    HCT 40.6 07/06/2021    MCV 87.7 07/06/2021     07/06/2021       Lab review: CBC, BMP unremarkable    Imaging review: I personally reviewed her CT head, CTA head and neck, CT perfusion and MRI.  CT head showed no acute findings, CT perfusion showed an area of tissue risk in the left temporal lobe with no core infarct, CTA head and neck showed no significant flow-limiting stenosis in the left M1 or M2 use but a likely perisylvian branch occlusion, possibly in an M5 branch.  On additional secondary read by radiology there is some concern for a weblike area of possible thrombus in left carotid artery which may be the original source, MRI shows multiple areas of small restricted diffusion in the left temporal lobe with no significant T2 flair correlation, no increased decrease signal on GRE sequences.  Discussed in detail with the reading radiologist.    Diagnoses:  Stroke, left middle cerebral  artery, embolic  Received TPA in the emergency department  Essential hypertension    Comment: Left MCA stroke, perisylvian branch occlusion with Warnicke's aphasia, status post TPA after a wake-up protocol MRI.  Plan to admit to ICU for post TPA care.  Suspected cardioembolic etiology but there is also a area of potential clot in the left ICA that may have been the original source.    PLAN:   Received TPA, goal blood pressure less than 180/105  ICU admission  2D echo complete  IV fluids     I was present in the ED for greater than 31 minutes performing critical care including patient assessment, imaging review, care coordination, and TPA decision making.    Electronically signed by Cesar Arzate MD at 07/06/21 0934

## 2021-07-07 NOTE — PLAN OF CARE
Goal Outcome Evaluation:              Outcome Summary: Discussed with RN. Patient passed dysphagia swallow screen and is tolerating diet. Noted aphasia, but per chart aphasia is improving. SLP to  follow for speech/language eval.

## 2021-07-07 NOTE — PROGRESS NOTES
"DOS: 2021  NAME: Ambar Bingham   : 1950  PCP: Provider, No Known  Chief Complaint   Patient presents with   • Neuro Deficit(s)       Chief complaint: stroke  Subjective: Persistent receptive aphasia today.  No significant neurologic change.  No acute events    Objective:  Vital signs: /80 (BP Location: Left arm, Patient Position: Lying)   Pulse 79   Temp 97.7 °F (36.5 °C)   Resp 16   Ht 165.1 cm (65\")   Wt 80.3 kg (177 lb 0.5 oz)   SpO2 98%   BMI 29.46 kg/m²    Gen: NAD, vitals reviewed  MS: oriented x3, recent/remote memory intact, normal attention/concentration, receptive aphasia and anomia, no neglect.  CN: visual acuity grossly normal, PERRL, EOMI, no facial droop, no dysarthria  Motor: 5/5 throughout upper and lower extremities, normal tone  Sensory: intact to light touch all 4 ext.    ROS:  No weakness, numbness  No fevers, chills      Laboratory results:  Lab Results   Component Value Date    GLUCOSE 158 (H) 2021    CALCIUM 9.3 2021     2021    K 3.3 (L) 2021    CO2 25.1 2021    CL 99 2021    BUN 15 2021    CREATININE 0.58 2021    EGFRIFNONA 103 2021    BCR 25.9 (H) 2021    ANIONGAP 11.9 2021     Lab Results   Component Value Date    WBC 10.83 (H) 2021    HGB 13.8 2021    HCT 41.2 2021    MCV 90.4 2021     2021     Lab Results   Component Value Date    LDL 69 2021    LDL 51 2020    LDL 40 2019         Lab 21  0340   HEMOGLOBIN A1C 6.20*        Review of labs: Glucose 158, LDL 69    Review and interpretation of imaging: Follow-up head CT pending.  CTA head and neck reviewed in detail.  She has a small left carotid stenosis but no plaque.  Radiologist noted a small weblike area in the left carotid.  No thrombus seen.  Radiology report reviewed.    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  Status post TPA.    MRI " 7/6: Patchy left temporoparietal infarcts  CTA: Weblike area in the left carotid but no thrombus or plaque seen  2D echocardiogram: Pending    Diagnoses:  Stroke, left middle cerebral artery, embolic  Cryptogenic stroke  Receptive aphasia    Comment: Embolic left MCA stroke status post TPA.  Not much clinical improvement so far.  2D echo pending.  Anticipate pursuing MINDY or potentially long-term cardiac monitoring    Plan:  1.  Aspirin, statin after 24 hours post TPA head CT  2.  24-hour head CT this morning  3.  2D echocardiogram    Discussed with patient, family at the bedside.  Continue to monitor in ICU today

## 2021-07-07 NOTE — THERAPY EVALUATION
Patient Name: mAbar Bingham  : 1950    MRN: [id number]                              Today's Date: 2021       Admit Date: 2021    Visit Dx:     ICD-10-CM ICD-9-CM   1. Acute CVA (cerebrovascular accident) (CMS/[facility])  I63.9 434.91     Patient Active Problem List   Diagnosis   • Acute CVA (cerebrovascular accident) (CMS/[facility])     No past medical history on file.  No past surgical history on file.  General Information     Row Name 21 1338          OT Time and Intention    Document Type  evaluation  -     Mode of Treatment  occupational therapy;individual therapy  -     Row Name 21 133          General Information    Patient Profile Reviewed  yes  -BERNICE     Prior Level of Function  independent:;ADL's;all household mobility  -     Existing Precautions/Restrictions  fall  -     Barriers to Rehab  ineffective coping  -     Row Name 21 133          Occupational Profile    Reason for Services/Referral (Occupational Profile)  Pt is a 69 y/o F admit after being found down by her son with aphasia. Pt is s/p tPa. Work up showed L MCA stroke with Wernicke’s aphasia and L ICA clot. PMH includes HTN, DM, HLD, Depression and hypothyroidism. Pt resides alone in a one story home with few LETY, but her son can stay for awhile if need be. Pt reports indep with ADLs and mobility PTA.  -     Row Name 21 133          Living Environment    Lives With  alone  -     Row Name 21 133          Home Main Entrance    Number of Stairs, Main Entrance  two  -     Row Name 21 133          Cognition    Orientation Status (Cognition)  oriented x 3;verbal cues/prompts needed for orientation  -     Row Name 21 1332          Safety Issues, Functional Mobility    Impairments Affecting Function (Mobility)  balance;endurance/activity tolerance;coordination;grasp;sensation/sensory awareness  -     Comment, Safety Issues/Impairments (Mobility)  gait belt and non skid socks used for safety   -       User Key  (r) = Recorded By, (t) = Taken By, (c) = Cosigned By    Initials Name Provider Type    BERNICE Bertha Shepard OT Occupational Therapist          Mobility/ADL's     Row Name 07/07/21 1341          Bed Mobility    Bed Mobility  bed mobility (all) activities  -     All Activities, [place] (Bed Mobility)  standby assist;supervision  -     Assistive Device (Bed Mobility)  head of bed elevated  -     Row Name 07/07/21 1341          Transfers    Transfers  sit-stand transfer;toilet transfer  -     Sit-Stand [place] (Transfers)  minimum assist (75% patient effort);1 person assist  -     [place] Level (Toilet Transfer)  minimum assist (75% patient effort);1 person assist  -     Assistive Device (Toilet Transfer)  commode  -     Row Name 07/07/21 1341          Toilet Transfer    Type (Toilet Transfer)  stand-sit;sit-stand  -     Row Name 07/07/21 1341          Functional Mobility    Functional Mobility- Ind. Level  minimum assist (75% patient effort);1 person  -     Functional Mobility-Distance (Feet)  5  -     Row Name 07/07/21 1341          Activities of Daily Living    BADL Assessment/Intervention  toileting;lower body dressing;grooming  -     Row Name 07/07/21 1341          Toileting Assessment/Training    [place] Level (Toileting)  adjust/manage clothing;perform perineal hygiene;set up;standby assist;verbal cues  -     Assistive Devices (Toileting)  commode  -BERNICE     Position (Toileting)  supported sitting;supported standing  -BERNICE     Comment (Toileting)  Pt completed toilet hygiene from commode level with SBA while seated. Pt later completed kyle hygiene 2/2 recent incontinence episodes in standing with SBA, CGA provided for balance.  -     Row Name 07/07/21 1341          Lower Body Dressing Assessment/Training    [place] Level (Lower Body Dressing)  don;doff;socks;minimum assist (75% patient effort)  -     Position (Lower Body Dressing)  edge of  bed sitting  -BERNICE     Comment (Lower Body Dressing)  Pt able to don socks despite c/o sensation and coordination deficits in R index finger, requiring min A for balance when reaching to her feet from EOB level.  -     Row Name 07/07/21 1341          Grooming Assessment/Training    [place] Level (Grooming)  hair care, combing/brushing;oral care regimen;wash face, hands;set up;supervision  -BERNICE     Position (Grooming)  supported sitting  -       User Key  (r) = Recorded By, (t) = Taken By, (c) = Cosigned By    Initials Name Provider Type    Bertha Bowser OT Occupational Therapist        Obj/Interventions     Row Name 07/07/21 1345          Sensory Assessment (Somatosensory)    Sensory Assessment (Somatosensory)  right UE  -     Right UE Sensory Assessment  impaired;other (see comments) Pt reports numbness (severe) in R index finger.  -     Row Name 07/07/21 1345          Vision Assessment/Intervention    Visual Impairment/Limitations  WFL  -     Row Name 07/07/21 1345          Range of Motion Comprehensive    General Range of Motion  no range of motion deficits identified  -[facility] Name 07/07/21 1345          Strength Comprehensive (MMT)    Comment, General Manual Muscle Testing (MMT) Assessment  slight deficit of R side weakness compared to L, but WFL  -     Row Name 07/07/21 1345          Motor Skills    Motor Skills  coordination  -     Coordination  bilateral;finger to nose;minimal impairment  -[facility] Name 07/07/21 1345          Balance    Balance Assessment  sitting static balance;sitting dynamic balance;standing static balance;standing dynamic balance  -     Static Sitting Balance  WFL;unsupported;sitting, edge of bed  -     Dynamic Sitting Balance  mild impairment;supported;sitting, edge of bed  -     Static Standing Balance  mild impairment;supported;standing  -BERNICE     Dynamic Standing Balance  mild impairment;supported;standing  -BERNICE     Balance Interventions  occupation  based/functional task  -BERNICE     Comment, Balance  Pt requires min A for balance while at EOB completing application of socks.  -BERNICE       User Key  (r) = Recorded By, (t) = Taken By, (c) = Cosigned By    Initials Name Provider Type    Bertha Bowser OT Occupational Therapist        Goals/Plan     Row Name 07/07/21 1351          Transfer Goal 1 (OT)    Activity/Assistive Device (Transfer Goal 1, OT)  transfers, all  -BERNICE     [place] Level/Cues Needed (Transfer Goal 1, OT)  modified independence  -BERNICE     Time Frame (Transfer Goal 1, OT)  short term goal (STG);2 weeks  -BERNICE     Row Name 07/07/21 1351          Bathing Goal 1 (OT)    Activity/Device (Bathing Goal 1, OT)  bathing skills, all  -BERNICE     [place] Level/Cues Needed (Bathing Goal 1, OT)  modified independence  -BERNICE     Time Frame (Bathing Goal 1, OT)  short term goal (STG);2 weeks  -BERNICE     Row Name 07/07/21 1351          Dressing Goal 1 (OT)    Activity/Device (Dressing Goal 1, OT)  dressing skills, all  -BERNICE     [place]/Cues Needed (Dressing Goal 1, OT)  modified independence  -BERNICE     Time Frame (Dressing Goal 1, OT)  short term goal (STG);2 weeks  -BERNICE     Row Name 07/07/21 1351          Therapy Assessment/Plan (OT)    Planned Therapy Interventions (OT)  activity tolerance training;functional balance retraining;occupation/activity based interventions;ROM/therapeutic exercise;strengthening exercise;transfer/mobility retraining;patient/caregiver education/training;neuromuscular control/coordination retraining;cognitive/visual perception retraining;edema control/reduction;BADL retraining;adaptive equipment training  -       User Key  (r) = Recorded By, (t) = Taken By, (c) = Cosigned By    Initials Name Provider Type    Bertha Bowser OT Occupational Therapist        Clinical Impression     Row Name 07/07/21 8920          Pain Assessment    Additional Documentation  Pain Scale: FACES Pre/Post-Treatment (Group)  -     Row Name  07/07/21 1347          Pain Scale: Numbers Pre/Post-Treatment    Pain Intervention(s)  Repositioned;Ambulation/increased activity;Emotional support  -BERNICE     Row Name 07/07/21 1347          Pain Scale: FACES Pre/Post-Treatment    Pain: FACES Scale, Pretreatment  0-->no hurt  -BERNICE     Posttreatment Pain Rating  0-->no hurt  -BERNICE     Row Name 07/07/21 1347          Plan of Care Review    Plan of Care Reviewed With  patient  -BERNICE     Outcome Summary  Pt is a 69 y/o F admit after being found down by her son with aphasia. Pt is s/p tPa. Work up showed L MCA stroke with Wernicke’s aphasia and L ICA clot. PMH includes HTN, DM, HLD, Depression and hypothyroidism. Pt resides alone in a one story home with few LETY, but her son can stay for awhile if need be. Pt reports indep with ADLs and mobility PTA. Pt noted to be moderately aphasic and tearful throughout session. Pt transferred to [facility] with min A-CGA x 1. Pt noted impaired sensation and coordination in R index finger (severe in nature). Pt appears below ADL baseline, limited by impaired coordination, impaired balance, impaired endurance, impaired cognition, impaired sensation and acuity of illness. OT recommends continued treatment at 5x/week and d/c to IP rehab vs home with HH pending progress.  -BERNICE     Row Name 07/07/21 1347          Therapy Assessment/Plan (OT)    Rehab Potential (OT)  good, to achieve stated therapy goals  -BERNICE     Criteria for Skilled Therapeutic Interventions Met (OT)  yes;skilled treatment is necessary  -BERNICE     Therapy Frequency (OT)  5 times/wk  -BERNICE     Row Name 07/07/21 1347          Therapy Plan Review/Discharge Plan (OT)    Anticipated Discharge Disposition (OT)  inpatient rehabilitation facility;home with home health  -BERNICE     Row Name 07/07/21 1347          Positioning and Restraints    Pre-Treatment Position  in bed  -BERNICE     Post Treatment Position  chair  -BERNICE     In Chair  notified nsg;sitting;call light within reach;encouraged to call for  assist;exit alarm on  -       User Key  (r) = Recorded By, (t) = Taken By, (c) = Cosigned By    Initials Name Provider Type    Bertha Bowser OT Occupational Therapist        Outcome Measures     Row Name 07/07/21 1351          How much help from another is currently needed...    Putting on and taking off regular lower body clothing?  3  -BERNICE     Bathing (including washing, rinsing, and drying)  3  -BERNICE     Toileting (which includes using toilet bed pan or urinal)  3  -BERNICE     Putting on and taking off regular upper body clothing  3  -BERNICE     Taking care of personal grooming (such as brushing teeth)  3  -BERNICE     Eating meals  3  -BERNICE     AM-PAC 6 Clicks Score (OT)  18  -     Row Name 07/07/21 135          Modified [place] Scale    Modified [place] Scale  4 - Moderately severe disability.  Unable to walk without assistance, and unable to attend to own bodily needs without assistance.  -     Row Name 07/07/21 1351          Functional Assessment    Outcome Measure Options  AM-PAC 6 Clicks Daily Activity (OT);Modified [place]  -       User Key  (r) = Recorded By, (t) = Taken By, (c) = Cosigned By    Initials Name Provider Type    Bertha Bowser OT Occupational Therapist        Occupational Therapy Education                 Title: PT OT SLP Therapies (Done)     Topic: Occupational Therapy (Done)     Point: ADL training (Done)     Description:   Instruct learner(s) on proper safety adaptation and remediation techniques during self care or transfers.   Instruct in proper use of assistive devices.              Learning Progress Summary           Patient Acceptance, E,TB, VU by BERNICE at 7/7/2021 1352                               User Key     Initials Effective Dates Name Provider Type Discipline     06/16/21 -  Bertha Shepard OT Occupational Therapist OT              OT Recommendation and Plan  Planned Therapy Interventions (OT): activity tolerance training, functional balance retraining,  occupation/activity based interventions, ROM/therapeutic exercise, strengthening exercise, transfer/mobility retraining, patient/caregiver education/training, neuromuscular control/coordination retraining, cognitive/visual perception retraining, edema control/reduction, BADL retraining, adaptive equipment training  Therapy Frequency (OT): 5 times/wk  Plan of Care Review  Plan of Care Reviewed With: patient  Outcome Summary: Pt is a 69 y/o F admit after being found down by her son with aphasia. Pt is s/p tPa. Work up showed L MCA stroke with Wernicke’s aphasia and L ICA clot. PMH includes HTN, DM, HLD, Depression and hypothyroidism. Pt resides alone in a one story home with few LETY, but her son can stay for awhile if need be. Pt reports indep with ADLs and mobility PTA. Pt noted to be moderately aphasic and tearful throughout session. Pt transferred to [facility] with min A-CGA x 1. Pt noted impaired sensation and coordination in R index finger (severe in nature). Pt appears below ADL baseline, limited by impaired coordination, impaired balance, impaired endurance, impaired cognition, impaired sensation and acuity of illness. OT recommends continued treatment at 5x/week and d/c to IP rehab vs home with HH pending progress.     Time Calculation:   Time Calculation- OT     Row Name 07/07/21 1352             Time Calculation- OT    OT Start Time  1030  -BERNICE      OT Stop Time  1101  -BERNICE      OT Time Calculation (min)  31 min  -BERNICE      Total Timed Code Minutes- OT  23 minute(s)  -BERNICE      OT Received On  07/07/21  -BERNICE      OT - Next Appointment  07/08/21  -BERNICE      OT Goal Re-Cert Due Date  07/21/21  -BERNICE         Timed Charges    75406 - OT Self Care/Mgmt Minutes  23  -BERNICE         Untimed Charges    OT Eval/Re-eval Minutes  8  -BERNICE         Total Minutes    Timed Charges Total Minutes  23  -BERNICE      Untimed Charges Total Minutes  8  -BERNICE       Total Minutes  31  -BERNICE        User Key  (r) = Recorded By, (t) = Taken By, (c) = Cosigned  By    Initials Name Provider Type    Bertha Bowser OT Occupational Therapist        Therapy Charges for Today     Code Description Service Date Service Provider Modifiers Qty    88011068754 HC OT SELF CARE/MGMT/TRAIN EA 15 MIN 7/7/2021 Bertha Shepard OT GO 2    [id number]  OT EVAL MOD COMPLEXITY 2 7/7/2021 Bertha Shepard OT GO 1               Bertha Shepard OT  7/7/2021

## 2021-07-07 NOTE — PROGRESS NOTES
[facility]     Mar/chart reviewed  Follow up Acute CVA  Some continue aphasia limits subjective    Vital Sign Min/Max for last 24 hours  Temp  Min: 97.7 °F (36.5 °C)  Max: 98.4 °F (36.9 °C)   BP  Min: 104/83  Max: 181/95   Pulse  Min: 71  Max: 98   Resp  Min: 10  Max: 16   SpO2  Min: 89 %  Max: 100 %   Flow (L/min)  Min: 2  Max: 2   Weight  Min: 80.3 kg (177 lb 0.5 oz)  Max: 80.7 kg (178 lb)   2001/1050  Appears ill, alert, unable to assess orientation   perrl, eomi, normal sclera,   mmm, no jvd, trachea midline, neck supple,  chest cta bilaterally, no crackles, no wheezes,   rrr,   soft, nt, nd +bs,  no c/c/ e  Skin warm, dry no rashes    Labs: 7/7: reviewed:  ldl 69  Triglycerides 73  Glucose 158  Bun 15  Cr 0.58  Na 136  k 3.3  biarb 25  Wbc 10.8  hgb 13.8  plts 338    7/6: mri brain:   1. Small areas of restricted diffusion consistent with areas of   infarction are noted, as well as subtle diffusion weighted   hyperintensity involving the parieto-occipital cortex posterior   laterally. There is only minimal diffusion hyperintensity appreciated.   2. There is increased signal intensity present within the branches of   the inferior division of the left MCA consistent with slow flow evident   on the axial T2 FLAIR sequence    1. Acute CVA s/p TPA -- continue care as per protocol, neuro checks  bp control. Risk factor modification, will need 2d echo.  2. HTN -- goal bp less than 180/105; iv meds as needed  3. HLD --statin  4. Hypokalemia -- replace  5. DM -- ssi  6. Depression - can resume ssri when able to take po  7. Hypothyroidism -- continue replacement

## 2021-07-07 NOTE — PLAN OF CARE
Goal Outcome Evaluation:  Plan of Care Reviewed With: patient, son  Progress: improving  Outcome Summary: Pt remains in ICU. Aphasic. Intermittently answers questions correctly. Follows simple commands. Adequate UOP. No BM. Potassium replaced this morning. 24hr post TPA CT ordered for 0800. Will CTM.

## 2021-07-07 NOTE — PLAN OF CARE
Goal Outcome Evaluation:  Plan of Care Reviewed With: patient           Outcome Summary: Pt is a 71 y/o F admit after being found down by her son with aphasia. Pt is s/p tPa. Work up showed L MCA stroke with Wernicke’s aphasia and L ICA clot. PMH includes HTN, DM, HLD, Depression and hypothyroidism. Pt resides alone in a one story home with few LETY, but her son can stay for awhile if need be. Pt reports indep with ADLs and mobility PTA. Pt noted to be moderately aphasic and tearful throughout session. Pt transferred to [facility] with min A-CGA x 1. Pt noted impaired sensation and coordination in R index finger (severe in nature). Pt appears below ADL baseline, limited by impaired coordination, impaired balance, impaired endurance, impaired cognition, impaired sensation and acuity of illness. OT recommends continued treatment at 5x/week and d/c to IP rehab vs home with HH pending progress.

## 2021-07-08 LAB
ANION GAP SERPL CALCULATED.3IONS-SCNC: 10.5 MMOL/L (ref 5–15)
BUN SERPL-MCNC: 10 MG/DL (ref 8–23)
BUN/CREAT SERPL: 23.8 (ref 7–25)
CALCIUM SPEC-SCNC: 8.7 MG/DL (ref 8.6–10.5)
CHLORIDE SERPL-SCNC: 103 MMOL/L (ref 98–107)
CO2 SERPL-SCNC: 25.5 MMOL/L (ref 22–29)
CREAT SERPL-MCNC: 0.42 MG/DL (ref 0.57–1)
GFR SERPL CREATININE-BSD FRML MDRD: 149 ML/MIN/1.73
GLUCOSE BLDC GLUCOMTR-MCNC: 102 MG/DL (ref 70–130)
GLUCOSE BLDC GLUCOMTR-MCNC: 110 MG/DL (ref 70–130)
GLUCOSE BLDC GLUCOMTR-MCNC: 118 MG/DL (ref 70–130)
GLUCOSE BLDC GLUCOMTR-MCNC: 93 MG/DL (ref 70–130)
GLUCOSE BLDC GLUCOMTR-MCNC: 97 MG/DL (ref 70–130)
GLUCOSE SERPL-MCNC: 120 MG/DL (ref 65–99)
POTASSIUM SERPL-SCNC: 3.8 MMOL/L (ref 3.5–5.2)
SARS-COV-2 ORF1AB RESP QL NAA+PROBE: NOT DETECTED
SODIUM SERPL-SCNC: 139 MMOL/L (ref 136–145)

## 2021-07-08 PROCEDURE — 99253 IP/OBS CNSLTJ NEW/EST LOW 45: CPT | Performed by: INTERNAL MEDICINE

## 2021-07-08 PROCEDURE — U0004 COV-19 TEST NON-CDC HGH THRU: HCPCS | Performed by: INTERNAL MEDICINE

## 2021-07-08 PROCEDURE — 99254 IP/OBS CNSLTJ NEW/EST MOD 60: CPT | Performed by: INTERNAL MEDICINE

## 2021-07-08 PROCEDURE — 97530 THERAPEUTIC ACTIVITIES: CPT

## 2021-07-08 PROCEDURE — 94799 UNLISTED PULMONARY SVC/PX: CPT

## 2021-07-08 PROCEDURE — 80048 BASIC METABOLIC PNL TOTAL CA: CPT | Performed by: INTERNAL MEDICINE

## 2021-07-08 PROCEDURE — 97162 PT EVAL MOD COMPLEX 30 MIN: CPT

## 2021-07-08 PROCEDURE — 92523 SPEECH SOUND LANG COMPREHEN: CPT

## 2021-07-08 PROCEDURE — 82962 GLUCOSE BLOOD TEST: CPT

## 2021-07-08 PROCEDURE — 99232 SBSQ HOSP IP/OBS MODERATE 35: CPT | Performed by: PSYCHIATRY & NEUROLOGY

## 2021-07-08 PROCEDURE — 97535 SELF CARE MNGMENT TRAINING: CPT

## 2021-07-08 RX ORDER — INSULIN LISPRO 100 [IU]/ML
0-9 INJECTION, SOLUTION INTRAVENOUS; SUBCUTANEOUS
Status: DISCONTINUED | OUTPATIENT
Start: 2021-07-08 | End: 2021-07-10 | Stop reason: HOSPADM

## 2021-07-08 RX ADMIN — ASPIRIN 325 MG: 325 TABLET ORAL at 08:10

## 2021-07-08 RX ADMIN — SODIUM CHLORIDE 75 ML/HR: 9 INJECTION, SOLUTION INTRAVENOUS at 05:25

## 2021-07-08 RX ADMIN — DOCUSATE SODIUM 50MG AND SENNOSIDES 8.6MG 2 TABLET: 8.6; 5 TABLET, FILM COATED ORAL at 22:55

## 2021-07-08 RX ADMIN — SODIUM CHLORIDE, PRESERVATIVE FREE 10 ML: 5 INJECTION INTRAVENOUS at 22:56

## 2021-07-08 RX ADMIN — DOCUSATE SODIUM 50MG AND SENNOSIDES 8.6MG 2 TABLET: 8.6; 5 TABLET, FILM COATED ORAL at 08:10

## 2021-07-08 RX ADMIN — SODIUM CHLORIDE, PRESERVATIVE FREE 10 ML: 5 INJECTION INTRAVENOUS at 08:10

## 2021-07-08 RX ADMIN — ATORVASTATIN CALCIUM 80 MG: 80 TABLET, FILM COATED ORAL at 22:55

## 2021-07-08 RX ADMIN — LISINOPRIL: 20 TABLET ORAL at 08:10

## 2021-07-08 RX ADMIN — LEVOTHYROXINE SODIUM 100 MCG: 0.1 TABLET ORAL at 06:15

## 2021-07-08 NOTE — PLAN OF CARE
Goal Outcome Evaluation:  Plan of Care Reviewed With: patient, son  Progress: improving  VSS, pt denies pain, tx from ICU to 5P, worked c/ ST, PT, & OT, NIH 2 RT aphasia, planning rehab @ [facility], plan for MINDY & loop recorder implantation tomorrow, consents signed, pt to be NPO at midnight for the MINDY but is allowed to eat prior to the loop recorder implantation, pt's son, Cipriano, at bedside provided daily update,CTM

## 2021-07-08 NOTE — PLAN OF CARE
Goal Outcome Evaluation:  Plan of Care Reviewed With: patient           Outcome Summary: SLP completed informal Cilbeq-Rvucfzzn-Bxrdkjnjn Evaluation with patient. The patient presents with moderate receptive and expressive aphasia. The patient is oriented to self, place, date, and time. The patient answered concrete yes/no questions with 90% accuracy. The patient answered abstract yes/no questions with 30% accuracy. The patient follow 1 step commands with 50% accuracy and 2 step commands with 30% acc. The patient completed word repetition with 90% acc, phrase repetition with 50% acc, and was unable to complete sentence repetition. The patient completed confrontational naming with 50% acc (items in room) independently, phonemic cues increased accuracy to 70% acc. Naming of pictured (less common items) 30 % accuracy. Patient completed automatic speech tasks with 100% acc (counting 1-20, and days of week).  The patient completed responsive naming with 0% acc independently, phonemic cues minimally aided in improving responses. The patient completed concrete divergent classification with 60% acc independently. Patient verbalized frustration with word finding difficulty.      Recommend acute inpatient rehab. SLP to follow for language therapy. Receptive and expressive deficits noted. Patient verbalizing frustration regarding language impairment.     Patient  intermittently  wearing a face mask during this therapy encounter. Therapist used appropriate personal protective equipment including mask, eye protection and gloves.  Mask used was N95/duckbill. Appropriate PPE was worn during the entire therapy session. Hand hygiene was completed before and after therapy session. Patient is not in enhanced droplet precautions.

## 2021-07-08 NOTE — PLAN OF CARE
Pt pleasant and agreeable to OT. Transfer to [facility] this afternoon. Expressive aphasia continued. Supervision to transfer to EOB. Min A to stand and CGA to mobilize to bathroom. Mod A for toileting from sitting and standing on commode. Sinkside ADL's with CGA. Pt completed OOB mobility in her hospital room with CGA/Min A with no walker used. Pt returned to bed with supervision. OT recommends continued skilled inpatient OT services with a d/c to acute rehab. Pt requesting SNF at her employer.     Patient was not wearing a face mask during this therapy encounter. Therapist used appropriate personal protective equipment including mask, goggles and gloves. Mask used was standard procedure mask. Appropriate PPE was worn during the entire therapy session. Hand hygiene was completed before and after therapy session. Patient is not in enhanced droplet precautions.

## 2021-07-08 NOTE — PROGRESS NOTES
Continued Stay Note  [facility]     Patient Name: Ambar Bingham  MRN: [id number]  Today's Date: 7/8/2021    Admit Date: 7/6/2021    Discharge Plan     Row Name 07/08/21 1519       Plan    Plan  [facility] skilled PENDING [place] pre-cert, initiated today    Patient/Family in Agreement with Plan  yes    Plan Comments  CCP followed up with pt at bedside re: ST recommendation for inpatient rehab. Pt confirms her preference for rehab at [facility] where she works as an . [facility] initiating [place] pre-cert today. CCP to follow. Cassandra Tamayo LCSW        Discharge Codes    No documentation.             Bertha Tamayo LCSW

## 2021-07-08 NOTE — PROGRESS NOTES
Continued Stay Note  [facility]     Patient Name: Ambar Bingham  MRN: [id number]  Today's Date: 7/8/2021    Admit Date: 7/6/2021    Discharge Plan     Row Name 07/08/21 1102       Plan    Plan  [facility] PENDING [place] pre-cert, initiated today    Patient/Family in Agreement with Plan  yes    Plan Comments  Per [facility] to initiate [place] pre-cert for skilled rehab today. Awaiting PT eval (consulted 7/6). CCP to follow. Cassandra Tamayo LCSW        Discharge Codes    No documentation.             Bertha Tamayo LCSW

## 2021-07-08 NOTE — CONSULTS
Patient Name: Ambar Bingham  Age/Sex: 70 y.o. female  : 1950  MRN: [id number]    Date of Admission: 2021  Date of Encounter Visit: 21  Encounter Provider: Singh Bruner MD  Place of Service: [facility] CARDIOLOGY      Referring Provider: Skinny Gautam MD  Patient Care Team:  Provider, No Known as PCP - General    Subjective:       Chief Complaint:   Aphasia    History of Present Illness:  Ambar Bingham is a 70 y.o. female presenting with aphasia.  CT scan was concerning for thrombus in the left inferior division of the MCA.  The patient received TPA.  Patient has stable receptive aphasia.  Her son is in the room with her.    She denies any history of tachycardia or palpitations.      Past Medical History:  History reviewed. No pertinent past medical history.    History reviewed. No pertinent surgical history.    Home Medications:   Medications Prior to Admission   Medication Sig Dispense Refill Last Dose   • amLODIPine (NORVASC) 2.5 MG tablet Take 2.5 mg by mouth Daily.      • FLUoxetine (PROzac) 20 MG capsule Take 20 mg by mouth Daily.      • levothyroxine (SYNTHROID, LEVOTHROID) 100 MCG tablet Take 100 mcg by mouth Daily.      • lisinopril-hydrochlorothiazide (PRINZIDE,ZESTORETIC) 20-12.5 MG per tablet Take 1 tablet by mouth Daily.      • metFORMIN (GLUCOPHAGE) 1000 MG tablet Take 1,000 mg by mouth 2 (Two) Times a Day With Meals.      • simvastatin (ZOCOR) 40 MG tablet Take 40 mg by mouth Every Night.      • solifenacin (VESICARE) 5 MG tablet Take 5 mg by mouth Daily.      • Vitamin D, Cholecalciferol, 50 MCG (2000 UT) capsule Take 400 Units by mouth Daily.          Allergies:  No Known Allergies    Past Social History:  Social History     Socioeconomic History   • Marital status: Single     Spouse name: Not on file   • Number of children: Not on file   • Years of education: Not on file   • Highest education level: Not on file   Tobacco Use   • Smoking status:  Never Smoker   • Smokeless tobacco: Never Used        Past Family History:  History reviewed. No pertinent family history.    Review of Systems: All systems reviewed. Pertinent positives identified in HPI. All other systems are negative.     Review of Systems   Constitutional: Negative for malaise/fatigue.   HENT: Negative.    Eyes: Negative.    Cardiovascular: Negative for chest pain, dyspnea on exertion, leg swelling and near-syncope.   Respiratory: Negative for cough and shortness of breath.    Endocrine: Negative.    Hematologic/Lymphatic: Negative.    Skin: Negative.    Musculoskeletal: Negative.    Gastrointestinal: Negative.    Genitourinary: Negative.    Neurological: Negative.  Negative for weakness.   Psychiatric/Behavioral: Negative.    Allergic/Immunologic: Negative.          Objective:     Objective:  Temp:  [97.7 °F (36.5 °C)-97.9 °F (36.6 °C)] 97.9 °F (36.6 °C)  Heart Rate:  [63-86] 73  Resp:  [14-18] 18  BP: (145-164)/() 159/94    Intake/Output Summary (Last 24 hours) at 7/8/2021 1703  Last data filed at 7/8/2021 1654  Gross per 24 hour   Intake 1995 ml   Output 1550 ml   Net 445 ml     Body mass index is 29.39 kg/m².      07/06/21  2300 07/07/21  1536 07/08/21  0529   Weight: 80.3 kg (177 lb 0.5 oz) 80.3 kg (177 lb) 80.1 kg (176 lb 9.4 oz)           Physical Exam:   Vitals and nursing note reviewed.   HENT:    Mouth/Throat:      Pharynx: Oropharynx is clear.   Pulmonary:      Effort: Pulmonary effort is normal.   Cardiovascular:      Normal rate. Regular rhythm.   Edema:     Peripheral edema absent.   Skin:     General: Skin is warm.   Neurological:      Mental Status: Alert, oriented to person, place, and time and oriented to person, place and time.   Psychiatric:         Attention and Perception: Attention and perception normal.           Lab Review:     Results from last 7 days   Lab Units 07/08/21  0816 07/07/21  0340 07/06/21  0824   SODIUM mmol/L 139 136 136   POTASSIUM mmol/L 3.8 3.3*  3.2*   CHLORIDE mmol/L 103 99 97*   CO2 mmol/L 25.5 25.1 26.3   BUN mg/dL 10 15 13   CREATININE mg/dL 0.42* 0.58 0.55*   GLUCOSE mg/dL 120* 158* 118*   CALCIUM mg/dL 8.7 9.3 9.8       Results from last 7 days   Lab Units 07/06/21  0824   TROPONIN T ng/mL <0.010     Results from last 7 days   Lab Units 07/07/21  0340   WBC 10*3/mm3 10.83*   HEMOGLOBIN g/dL 13.8   HEMATOCRIT % 41.2   PLATELETS 10*3/mm3 338         Results from last 7 days   Lab Units 07/07/21  0340   CHOLESTEROL mg/dL 134         Results from last 7 days   Lab Units 07/07/21  0340   CHOLESTEROL mg/dL 134   TRIGLYCERIDES mg/dL 73   HDL CHOL mg/dL 50   LDL CHOL mg/dL 69                   Imaging:    Imaging Results (Most Recent)     Procedure Component Value Units Date/Time    CT Head Without Contrast [[id number]] Collected: 07/07/21 1101     Updated: 07/07/21 1129    Narrative:      CT HEAD WITHOUT CONTRAST     CLINICAL HISTORY: Follow-up infarct. Status post administration of TPA.     TECHNIQUE: CT scan of the head was obtained with 3 mm axial images. No  intravenous contrast was administered.     COMPARISON: Comparison is made to previous MRI of the brain performed on  07/06/2021.     FINDINGS: There is an abnormal area of hypodensity involving the lateral  aspect of the left temporal and occipital lobes which measures up to  approximately 8.6 x 3.3 cm in greatest axial dimensions. The findings  are compatible with an acute infarct within the left MCA distribution.  This infarct is increased in size when compared to the prior study where  very approximate measurement of the infarct is 4.7 x 1.9 cm.  Additionally, there is a focus of subacute infarction within the left  inferior parietal lobule measuring up to 1.1 x 0.9 cm in greatest axial  dimensions. This is also within the left MCA distribution and was seen  on the prior exam as a similar-sized infarct. Finally, an acute infarct  within the left precentral gyrus is noted measuring up to 1.7 x 1.0  cm  in greatest axial dimensions and again this is within the left MCA  distribution and a similar-sized infarct was appreciated on the prior  brain MRI. There is no evidence for hemorrhagic transformation. No  significant mass effect is seen as a result of these infarcts.     Otherwise, the ventricles, sulci, and cisterns are age appropriate. The  basal ganglia and thalami are unremarkable in appearance. The posterior  fossa structures are within normal limits.       Impression:      There is interval growth of the previously identified acute  infarct involving the lateral aspect of the left temporal and occipital  lobes as discussed in detail above. Additional areas of acute infarction  involving the left inferior parietal lobule and left precentral gyrus  were similarly evident on the previous MRI of the brain. There is no  evidence for hemorrhagic transformation at any of these sites and no  significant degree of mass effect is seen.     These findings were discussed with Dr. Arzate on 07/07/2021 at  approximately 10:40 AM.     Radiation dose reduction techniques were utilized, including automated  exposure control and exposure modulation based on body size.     This report was finalized on 7/7/2021 11:26 AM by Dr. Singh Cabezas M.D.       CT Angiogram Neck [[id number]] Collected: 07/06/21 1006     Updated: 07/06/21 1620    Narrative:            CT ANGIOGRAM NECK AND HEAD WITH CONTRAST AND CT PERFUSION WITH CONTRAST     HISTORY: Aphasia, confusion.     COMPARISON: None.     FINDINGS:     Initially, a noncontrasted CT examination of the brain was performed.  The brain and ventricles are symmetrical. There is no evidence of  hemorrhage, hydrocephalus or of abnormal extra-axial fluid. No focal  area of decreased attenuation to suggest acute infarction was  identified. The above information was called to and discussed with Dr. Arzate. Dr. Arzate requested further evaluation with CT perfusion  and CT  angiography.     CT PERFUSION: There is delayed perfusion involving the inferior division  of the left MCA estimated to be approximately 33 cc in volume and  without corresponding CBF abnormality.     CT ANGIOGRAM NECK AND HEAD WITH CONTRAST: A CT angiogram of the neck and  head was performed. Multiplanar, as well as 3-dimensional  reconstructions were generated.     FINDINGS: There is a bovine configuration to the great vessels. There is  mild atherosclerotic disease appreciated involving the carotid  bifurcations bilaterally, but with 0% stenosis using NASCET criteria.  There is a weblike filling defect involving the proximal aspects of the  internal carotid artery on the left posteriorly and medially. There is  no evidence of dangling clot. The distal aspects of the internal carotid  arteries and the proximal aspects of the anterior cerebral arteries and  of the right middle cerebral artery appear unremarkable. There is a  filling defect appreciated involving the inferior division of the left  middle cerebral artery. This involves a prominent branch within the  anterior aspect of the sylvian fissure. No other filling defects are  appreciated.     Both vertebral arteries were opacified. The left vertebral artery is  slightly larger than that of the right. The basilar artery and right  posterior cerebral artery appear unremarkable. There is a fetal origin  of the left posterior cerebral artery.       Impression:      1. There was no evidence of acute infarction or hemorrhage on the  noncontrasted CT examination of the brain.   2. The CT perfusion did demonstrate 33 cc of delayed perfusion involving  the inferior division of the left middle cerebral artery without  corresponding CBF abnormality.   3. Clot was identified within the inferior division of the left MCA  anteriorly within a prominent anterior sylvian branch.   4. There is mild irregularity involving the left internal carotid artery  with an adjacent thin  web. There is no evidence of dangling clot.     The noncontrasted CT examination of the brain was made available for  interpretation at 0835 hours and results called to Dr. Arzate at 0836  hours. The CT angiogram was made available for interpretation at 0847  hours and results called to Dr. Arzate at 0850 hours.     AI analysis of LVO was utilized.     Radiation dose reduction techniques were utilized, including automated  exposure control and exposure modulation based on body size.     This report was finalized on 7/6/2021 4:17 PM by Dr. Bijan Serna M.D.       CT CEREBRAL PERFUSION WITH & WITHOUT CONTRAST [[id number]] Collected: 07/06/21 1006     Updated: 07/06/21 1620    Narrative:            CT ANGIOGRAM NECK AND HEAD WITH CONTRAST AND CT PERFUSION WITH CONTRAST     HISTORY: Aphasia, confusion.     COMPARISON: None.     FINDINGS:     Initially, a noncontrasted CT examination of the brain was performed.  The brain and ventricles are symmetrical. There is no evidence of  hemorrhage, hydrocephalus or of abnormal extra-axial fluid. No focal  area of decreased attenuation to suggest acute infarction was  identified. The above information was called to and discussed with Dr. Arzate. Dr. Arzate requested further evaluation with CT perfusion  and CT angiography.     CT PERFUSION: There is delayed perfusion involving the inferior division  of the left MCA estimated to be approximately 33 cc in volume and  without corresponding CBF abnormality.     CT ANGIOGRAM NECK AND HEAD WITH CONTRAST: A CT angiogram of the neck and  head was performed. Multiplanar, as well as 3-dimensional  reconstructions were generated.     FINDINGS: There is a bovine configuration to the great vessels. There is  mild atherosclerotic disease appreciated involving the carotid  bifurcations bilaterally, but with 0% stenosis using NASCET criteria.  There is a weblike filling defect involving the proximal aspects of the  internal carotid  artery on the left posteriorly and medially. There is  no evidence of dangling clot. The distal aspects of the internal carotid  arteries and the proximal aspects of the anterior cerebral arteries and  of the right middle cerebral artery appear unremarkable. There is a  filling defect appreciated involving the inferior division of the left  middle cerebral artery. This involves a prominent branch within the  anterior aspect of the sylvian fissure. No other filling defects are  appreciated.     Both vertebral arteries were opacified. The left vertebral artery is  slightly larger than that of the right. The basilar artery and right  posterior cerebral artery appear unremarkable. There is a fetal origin  of the left posterior cerebral artery.       Impression:      1. There was no evidence of acute infarction or hemorrhage on the  noncontrasted CT examination of the brain.   2. The CT perfusion did demonstrate 33 cc of delayed perfusion involving  the inferior division of the left middle cerebral artery without  corresponding CBF abnormality.   3. Clot was identified within the inferior division of the left MCA  anteriorly within a prominent anterior sylvian branch.   4. There is mild irregularity involving the left internal carotid artery  with an adjacent thin web. There is no evidence of dangling clot.     The noncontrasted CT examination of the brain was made available for  interpretation at 0835 hours and results called to Dr. Arzate at 0836  hours. The CT angiogram was made available for interpretation at 0847  hours and results called to Dr. Arzate at 0850 hours.     AI analysis of LVO was utilized.     Radiation dose reduction techniques were utilized, including automated  exposure control and exposure modulation based on body size.     This report was finalized on 7/6/2021 4:17 PM by Dr. Bijan Serna M.D.       CT Angiogram Head w AI Analysis of LVO [[id number]] Collected: 07/06/21 1006     Updated:  07/06/21 1620    Narrative:            CT ANGIOGRAM NECK AND HEAD WITH CONTRAST AND CT PERFUSION WITH CONTRAST     HISTORY: Aphasia, confusion.     COMPARISON: None.     FINDINGS:     Initially, a noncontrasted CT examination of the brain was performed.  The brain and ventricles are symmetrical. There is no evidence of  hemorrhage, hydrocephalus or of abnormal extra-axial fluid. No focal  area of decreased attenuation to suggest acute infarction was  identified. The above information was called to and discussed with Dr. Arzate. Dr. Arzate requested further evaluation with CT perfusion  and CT angiography.     CT PERFUSION: There is delayed perfusion involving the inferior division  of the left MCA estimated to be approximately 33 cc in volume and  without corresponding CBF abnormality.     CT ANGIOGRAM NECK AND HEAD WITH CONTRAST: A CT angiogram of the neck and  head was performed. Multiplanar, as well as 3-dimensional  reconstructions were generated.     FINDINGS: There is a bovine configuration to the great vessels. There is  mild atherosclerotic disease appreciated involving the carotid  bifurcations bilaterally, but with 0% stenosis using NASCET criteria.  There is a weblike filling defect involving the proximal aspects of the  internal carotid artery on the left posteriorly and medially. There is  no evidence of dangling clot. The distal aspects of the internal carotid  arteries and the proximal aspects of the anterior cerebral arteries and  of the right middle cerebral artery appear unremarkable. There is a  filling defect appreciated involving the inferior division of the left  middle cerebral artery. This involves a prominent branch within the  anterior aspect of the sylvian fissure. No other filling defects are  appreciated.     Both vertebral arteries were opacified. The left vertebral artery is  slightly larger than that of the right. The basilar artery and right  posterior cerebral artery appear  unremarkable. There is a fetal origin  of the left posterior cerebral artery.       Impression:      1. There was no evidence of acute infarction or hemorrhage on the  noncontrasted CT examination of the brain.   2. The CT perfusion did demonstrate 33 cc of delayed perfusion involving  the inferior division of the left middle cerebral artery without  corresponding CBF abnormality.   3. Clot was identified within the inferior division of the left MCA  anteriorly within a prominent anterior sylvian branch.   4. There is mild irregularity involving the left internal carotid artery  with an adjacent thin web. There is no evidence of dangling clot.     The noncontrasted CT examination of the brain was made available for  interpretation at 0835 hours and results called to Dr. Arzate at 0836  hours. The CT angiogram was made available for interpretation at 0847  hours and results called to Dr. Arzate at 0850 hours.     AI analysis of LVO was utilized.     Radiation dose reduction techniques were utilized, including automated  exposure control and exposure modulation based on body size.     This report was finalized on 7/6/2021 4:17 PM by Dr. Bijan Serna M.D.       MRI Brain Without Contrast [[id number]] Collected: 07/06/21 1011     Updated: 07/06/21 1618    Narrative:      LIMITED NONCONTRASTED MRI EXAMINATION OF THE BRAIN     HISTORY: Stroke.     TECHNIQUE: A limited MRI examination of the brain was performed  utilizing axial diffusion, T2 FLAIR and gradient echo T2 imaging.     COMPARISON: CT angiogram 07/06/2021.     FINDINGS: The diffusion sequence demonstrates a curvilinear area of  restricted diffusion involving the left parietal lobe superiolaterally,  cortical and subcortical measuring 10 x 3 mm in size. A smaller  subcortical area of restricted diffusion involving the left occipital  lobe posteriorly is appreciated measuring approximately 7 mm in size.  There is subtle gyriform diffusion weighted  hyperintensity involving the  parieto-occipital region on the left posterior laterally. There is a  questionable mild area of delayed perfusion involving the subcortical  white matter of the frontoparietal region on the left superiorly  measuring approximately 10 mm in AP dimension.     The T2 FLAIR sequence demonstrates increased signal intensity within  branches of the left MCA consistent with slow flow. There is minimal  diffusion hyperintensity related to the area of suspected acute  infarction involving the left parietal lobe. There was no evidence of  hemorrhage.       Impression:      1. Small areas of restricted diffusion consistent with areas of  infarction are noted, as well as subtle diffusion weighted  hyperintensity involving the parieto-occipital cortex posterior  laterally. There is only minimal diffusion hyperintensity appreciated.   2. There is increased signal intensity present within the branches of  the inferior division of the left MCA consistent with slow flow evident  on the axial T2 FLAIR sequence. The above information was discussed with  Dr. Arzate while the patient was still in the MRI scanner.     This report was finalized on 7/6/2021 4:15 PM by Dr. Bijan Serna M.D.             EKG:   I reviewed her EKG tracing it shows sinus rhythm.      Baseline:     I personally viewed and interpreted the patient's EKG/Telemetry tracings.    Assessment:   Assessment/Plan         Acute CVA (cerebrovascular accident) (CMS/HCC)        Plan:     Cryptogenic stroke with thrombus in the left MCA.  Discussed possible contribution of paroxysmal atrial fibrillation, and surveillance with a loop monitor.  She and her family would like to proceed.  We will plan on do this tomorrow.    Thank you for allowing me to participate in the care of Ambar Bingham. Feel free to contact me directly with any further questions or concerns.    Singh Bruner MD  07/08/21  17:03 EDT

## 2021-07-08 NOTE — PROGRESS NOTES
"DOS: 2021  NAME: Ambar Bingham   : 1950  PCP: Provider, No Known  Chief Complaint   Patient presents with   • Neuro Deficit(s)       Chief complaint: Stroke  Subjective: Aphasia slightly improved per family at the bedside.  No acute events.  Patient also reports some apraxia of the right first and second digit.    Objective:  Vital signs: /67 (BP Location: Left arm, Patient Position: Lying)   Pulse 75   Temp 97.7 °F (36.5 °C) (Oral)   Resp 14   Ht 165.1 cm (65\")   Wt 80.1 kg (176 lb 9.4 oz)   SpO2 98%   BMI 29.39 kg/m²    Gen: NAD, vitals reviewed  MS: oriented x3, recent/remote memory intact, normal attention/concentration, moderate receptive aphasia with anomia, no neglect.  CN: visual acuity grossly normal, PERRL, EOMI, no facial droop, no dysarthria  Motor: Slight apraxia of the right hand, otherwise 5/5 throughout upper and lower extremities, normal tone  Sensory: intact to light touch all 4 ext.    ROS:  No weakness, numbness  No fevers, chills      Laboratory results:  Lab Results   Component Value Date    GLUCOSE 120 (H) 2021    CALCIUM 8.7 2021     2021    K 3.8 2021    CO2 25.5 2021     2021    BUN 10 2021    CREATININE 0.42 (L) 2021    EGFRIFNONA 149 2021    BCR 23.8 2021    ANIONGAP 10.5 2021     Lab Results   Component Value Date    WBC 10.83 (H) 2021    HGB 13.8 2021    HCT 41.2 2021    MCV 90.4 2021     2021     Lab Results   Component Value Date    LDL 69 2021    LDL 51 2020    LDL 40 2019         Lab 21  0340   HEMOGLOBIN A1C 6.20*        Review of labs: BMP unremarkable    Review and interpretation of imaging: I personally reviewed her CT head performed yesterday which shows left temporal acute infarct.  Discussed with the reading radiologist    Workup to date: Acute embolic left perisylvian branch occlusion with receptive aphasia.  Status " post TPA.     MRI 7/6: Patchy left temporoparietal infarcts  CTA: Weblike area in the left carotid but no thrombus or plaque seen  Follow-up head CT 7/7: Completed left MCA inferior division infarct  2D echocardiogram: Negative     Diagnoses:  Stroke, left middle cerebral artery, embolic  Cryptogenic stroke  Receptive aphasia    Comment: Stable receptive aphasia from left MCA inferior division stroke.  2D echo negative.  Will ask for a cardiology opinion on potential MINDY    Plan:  1.  Aspirin, statin  2.  Cardiology consult for potential MINDY  3.  Okay to downgrade to floor  4.  Anticipate need for outpatient speech therapy and occupational therapy for hand retraining    Discussed with patient, family, nursing staff.

## 2021-07-08 NOTE — PLAN OF CARE
Goal Outcome Evaluation:  Plan of Care Reviewed With: patient           Outcome Summary: Pt is a 69 yo F who was admitted with aphasia and CVA. Pt presents to PT with some impaired functional mobility and gait secondary to some R UE weakness and impaired balance. Pt may benefit from skilled PT to address strength, mobility, and gait.

## 2021-07-08 NOTE — PROGRESS NOTES
[facility]      Mar/chart reviewed  Follow up Acute CVA  Some continue aphasia limits subjective    Vital Sign Min/Max for last 24 hours  Temp  Min: 98 °F (36.7 °C)  Max: 98.2 °F (36.8 °C)   BP  Min: 141/80  Max: 164/97   Pulse  Min: 63  Max: 88   Resp  Min: 16  Max: 18   SpO2  Min: 90 %  Max: 100 %   Flow (L/min)  Min: 2  Max: 2   Weight  Min: 80.1 kg (176 lb 9.4 oz)  Max: 80.3 kg (177 lb)   1510/1250  Appears ill, alert, unable to assess orientation   perrl, eomi, normal sclera,   mmm, no jvd, trachea midline, neck supple,  chest cta bilaterally, no crackles, no wheezes,   rrr,   soft, nt, nd +bs,  no c/c/ e  Skin warm, dry no rashes    Labs: 7/8: reviewed: nothing new  7/7:  hgba 1c 6.2  ldl 69    7/7: ct head: reviewed:  There is interval growth of the previously identified acute   infarct involving the lateral aspect of the left temporal and occipital   lobes as discussed in detail above. Additional areas of acute infarction involving the left inferior parietal lobule and left precentral gyrus were similarly evident on the previous MRI of the brain. There is no evidence for hemorrhagic transformation at any of these sites and no significant degree of mass effect is seen.     2d echo: ok    1. Acute CVA s/p TPA -- continue care as per protocol, neuro checks  bp control. Risk factor modification, 2d echo done.  2. HTN -- goal bp less than 180/105; iv meds as needed; start po meds  3. HLD --statin  4. Hypokalemia -- replaced; repeat bmp  5. DM -- ssi;. hgba1c 6.2  6. Depression - can resume ssri when able to take po    She can transfer out of unit, she may need short term inpatient rehab?      7. Hypothyroidism -- continue replacement

## 2021-07-08 NOTE — THERAPY EVALUATION
Acute Care - Speech Language Pathology Initial Evaluation  [facility]     Patient Name: Ambar Bingham  : 1950  MRN: [id number]  Today's Date: 2021               Admit Date: 2021     Visit Dx:    ICD-10-CM ICD-9-CM   1. Acute CVA (cerebrovascular accident) (CMS/[facility])  I63.9 434.91     Patient Active Problem List   Diagnosis   • Acute CVA (cerebrovascular accident) (CMS/[facility])     History reviewed. No pertinent past medical history.  History reviewed. No pertinent surgical history.     SLP EVALUATION (last 72 hours)      SLP SLC Evaluation     Row Name 21 1145                   General Information    Precautions/Limitations, Vision  WFL;for purposes of eval  -OC        Precautions/Limitations, Hearing  WFL;for purposes of eval  -OC        Patient Level of Education  unknown  -OC        Prior Level of Function-Communication  WFL  -OC        Plans/Goals Discussed with  patient;agreed upon  -OC        Barriers to Rehab  none identified  -OC        Patient's Goals for Discharge  return to all previous roles/activities  -OC        Standardized Assessment Used  other (see comments) informal eval  -OC           Pain Scale: FACES Pre/Post-Treatment    Pain: FACES Scale, Pretreatment  0-->no hurt  -OC        Posttreatment Pain Rating  0-->no hurt  -OC           Comprehension Assessment/Intervention    Comprehension Assessment/Intervention  Auditory Comprehension  -OC           Auditory Comprehension Assessment/Intervention    Auditory Comprehension (Communication)  moderate impairment  -OC        Auditory Comprehension Communication, Comment  SLP completed informal Kzbsqc-Rpitzsaq-Vjjwyfebp Evaluation with patient. The patient presents with moderate receptive and expressive aphasia. The patient is oriented to self, place, date, and time. The patient answered concrete yes/no questions with 90% accuracy. The patient answered abstract yes/no questions with 30% accuracy. The patient follow 1 step commands with  50% accuracy and 2 step commands with 30% acc. The patient completed word repetition with 90% acc, phrase repetition with 50% acc, and was unable to complete sentence repetition. The patient completed confrontational naming with 50% acc (items in room) independently, phonemic cues increased accuracy to 70% acc. Naming of pictured (less common items) 30 % accuracy. Patient completed automatic speech tasks with 100% acc (counting 1-20, and days of week).  The patient completed responsive naming with 0% acc independently, phonemic cues minimally aided in improving responses. The patient completed concrete divergent classification with 60% acc independently. Patient verbalized frustration with word finding difficulty.   -OC           Expression Assessment/Intervention    Expression Assessment/Intervention  verbal expression  -OC           Verbal Expression Assessment/Intervention    Verbal Expression  moderate impairment  -OC           SLP Clinical Impressions    SLP Diagnosis  Moderate receptive/expressive aphasia  -OC        Rehab Potential/Prognosis  good  -OC        SLC Criteria for Skilled Therapy Interventions Met  yes  -OC        Functional Impact  difficulty communicating wants, needs;difficulty communicating in an emergency  -OC           Recommendations    Therapy Frequency (SLP SLC)  PRN  -OC        Predicted Duration Therapy Intervention (Days)  until discharge  -OC        Anticipated Discharge Disposition (SLP)  inpatient rehabilitation facility  -OC           Auditory Comprehension Treatment Objectives    Words/Phrases/Sentences Selection  words/phrases/sentences, SLP goal 1  -OC        Comprehend Questions Selection  comprehend questions, SLP goal 1  -OC           Words/Phrases/Sentences Goal 1 (SLP)    Improve Ability to Comprehend Words/Phrases/Sentences Through: Goal 1 (SLP)  identify body part;identify familiar objects;90%;independently (over 90% accuracy)  -OC        Time Frame (Identify Objects and  Pictures Goal 1, SLP)  by discharge  -OC           Comprehend Questions Goal 1 (SLP)    Improve Ability to Comprehend Questions Goal 1 (SLP)  complex yes/no questions;simple wh questions;simple general questions;abstract questions;90%;with minimal cues (75-90%)  -OC        Time Frame (Comprehend Questions Goal 1, SLP)  by discharge  -OC           Verbal Expression Treatment Objectives    Word Retrieval Skills Selection  word retrieval, SLP goal 1  -OC           Word Retrieval Skills Goal 1 (SLP)    Improve Word Retrieval Skills By Goal 1 (SLP)  confrontational naming task;low frequency;responsive naming task;complex  -OC        Time Frame (Word Retrieval Goal 1, SLP)  by discharge  -OC          User Key  (r) = Recorded By, (t) = Taken By, (c) = Cosigned By    Initials Name Effective Dates    OC Liv Tovar MA,[facility]-SLP 06/16/21 -              EDUCATION  The patient has been educated in the following areas:     Communication Impairment.    SLP Recommendation and Plan  SLP Diagnosis: Moderate receptive/expressive aphasia        SLC Criteria for Skilled Therapy Interventions Met: yes  Anticipated Discharge Disposition (SLP): inpatient rehabilitation facility        Predicted Duration Therapy Intervention (Days): until discharge                   Plan of Care Reviewed With: patient  Outcome Summary: SLP completed informal Flnqho-Pswpsthj-Cfhndecry Evaluation with patient. The patient presents with moderate receptive and expressive aphasia. The patient is oriented to self, place, date, and time. The patient answered concrete yes/no questions with 90% accuracy. The patient answered abstract yes/no questions with 30% accuracy. The patient follow 1 step commands with 50% accuracy and 2 step commands with 30% acc. The patient completed word repetition with 90% acc, phrase repetition with 50% acc, and was unable to complete sentence repetition. The patient completed confrontational naming with 50% acc (items in room)  independently, phonemic cues increased accuracy to 70% acc. Naming of pictured (less common items) 30 % accuracy. Patient completed automatic speech tasks with 100% acc (counting 1-20, and days of week).  The patient completed responsive naming with 0% acc independently, phonemic cues minimally aided in improving responses. The patient completed concrete divergent classification with 60% acc independently. Patient verbalized frustration with word finding difficulty.      SLP GOALS     Row Name 07/08/21 1145             Words/Phrases/Sentences Goal 1 (SLP)    Improve Ability to Comprehend Words/Phrases/Sentences Through: Goal 1 (SLP)  identify body part;identify familiar objects;90%;independently (over 90% accuracy)  -OC      Time Frame (Identify Objects and Pictures Goal 1, SLP)  by discharge  -OC         Comprehend Questions Goal 1 (SLP)    Improve Ability to Comprehend Questions Goal 1 (SLP)  complex yes/no questions;simple wh questions;simple general questions;abstract questions;90%;with minimal cues (75-90%)  -OC      Time Frame (Comprehend Questions Goal 1, SLP)  by discharge  -OC         Word Retrieval Skills Goal 1 (SLP)    Improve Word Retrieval Skills By Goal 1 (SLP)  confrontational naming task;low frequency;responsive naming task;complex  -OC      Time Frame (Word Retrieval Goal 1, SLP)  by discharge  -OC        User Key  (r) = Recorded By, (t) = Taken By, (c) = Cosigned By    Initials Name Provider Type    Liv Ragland MA, CCC-SLP Speech and Language Pathologist             SLP Outcome Measures (last 72 hours)      SLP Outcome Measures     Row Name 07/08/21 1344             SLP Outcome Measures    Outcome Measure Used?  Adult NOMS  -OC         Adult FCM Scores    FCM Chosen  Verbal Expression  -OC      Verbal Expression FCM Score  4  -OC        User Key  (r) = Recorded By, (t) = Taken By, (c) = Cosigned By    Initials Name Effective Dates    Liv Ragland MA, CCC-SLP 06/16/21 -               Time  Calculation:     Time Calculation- SLP     Row Name 07/08/21 1345             Time Calculation- SLP    SLP Start Time  1030  -OC      SLP Received On  07/08/21  -OC         Untimed Charges    SLP Eval/Re-eval   ST Eval Speech and Production w/ Language - 02797  -OC      12750-[place] Eval Speech and Production w/ Language Minutes  90  -OC         Total Minutes    Untimed Charges Total Minutes  90  -OC       Total Minutes  90  -OC        User Key  (r) = Recorded By, (t) = Taken By, (c) = Cosigned By    Initials Name Provider Type    Liv Ragland MA,CCC-SLP Speech and Language Pathologist          Therapy Charges for Today     Code Description Service Date Service Provider Modifiers Qty    [id number] HC ST EVAL SPEECH AND PROD W LANG  6 7/8/2021 Liv Tovar MA,JESSI-SLP GN 1             ADULT NOMS (last 72 hours)      Adult NOMS     Row Name 07/08/21 1344                   Adult FCM Scores    FCM Chosen  Verbal Expression  -OC        Verbal Expression FCM Score  4  -OC          User Key  (r) = Recorded By, (t) = Taken By, (c) = Cosigned By    Initials Name Effective Dates    Liv Ragland MA,JESSI-SLP 06/16/21 -                  Liv Tovar MA, CCC-SLP  7/8/2021

## 2021-07-08 NOTE — CONSULTS
Date of Hospital Visit: 2021  Encounter Provider: Dalton Beth RN  Place of Service: [facility] CARDIOLOGY  Patient Name: Ambar Bingham  :1950  Referral Provider: Skinny Gautam MD    Chief complaint: Aphasia    Reason for consult: Cardioembolic stroke, possible MINDY    History of Present Illness: 70-year-old female patient with history of hypertension diabetes.  She presented to the [facility] emergency department on 2021 with aphasia.  She was found of large area of tissue at risk in the left temporal lobe.  MRI revealed areas of diffusion restriction with no significant T2 flair correlation.  She was given TPA and admitted to the ICU for routine stroke care.  She has no past cardiac history but historical details are difficult to discern given her aphasia which is residual.  She denies any chest pain, shortness of air or palpitations.  No limiting symptoms with exertional activity.  No orthopnea, PND or edema.  No palpitations, dizziness or stroke.  Transthoracic echocardiogram was performed which showed normal left ventricular ejection fraction no significant valvular heart disease.  I am being consulted to evaluate for possible MINDY.      Echocardiogram 2021:  · Estimated left ventricular EF = 66% Estimated left ventricular EF was in agreement with the calculated left ventricular EF. Left ventricular systolic function is normal.  · Left ventricular diastolic function was normal.  · Saline test results are negative.  · Estimated right ventricular systolic pressure from tricuspid regurgitation is normal (<35 mmHg).      No past medical history on file.    No past surgical history on file.    Medications Prior to Admission   Medication Sig Dispense Refill Last Dose   • amLODIPine (NORVASC) 2.5 MG tablet Take 2.5 mg by mouth Daily.      • FLUoxetine (PROzac) 20 MG capsule Take 20 mg by mouth Daily.      • levothyroxine (SYNTHROID, LEVOTHROID) 100 MCG  tablet Take 100 mcg by mouth Daily.      • lisinopril-hydrochlorothiazide (PRINZIDE,ZESTORETIC) 20-12.5 MG per tablet Take 1 tablet by mouth Daily.      • metFORMIN (GLUCOPHAGE) 1000 MG tablet Take 1,000 mg by mouth 2 (Two) Times a Day With Meals.      • simvastatin (ZOCOR) 40 MG tablet Take 40 mg by mouth Every Night.      • solifenacin (VESICARE) 5 MG tablet Take 5 mg by mouth Daily.      • Vitamin D, Cholecalciferol, 50 MCG (2000 UT) capsule Take 400 Units by mouth Daily.          Current Meds  Scheduled Meds:aspirin, 325 mg, Oral, Daily   Or  aspirin, 300 mg, Rectal, Daily  atorvastatin, 80 mg, Oral, Nightly  insulin lispro, 0-9 Units, Subcutaneous, TID With Meals  levothyroxine, 100 mcg, Oral, Q AM  lisinopril-hydroCHLOROthiazide (ZESTORETIC) 20-12.5 mg combo dose, , Oral, Q24H  senna-docusate sodium, 2 tablet, Oral, BID  sodium chloride, 10 mL, Intravenous, Q12H  sodium chloride, 10 mL, Intravenous, Q12H      Continuous Infusions:   PRN Meds:.•  acetaminophen **OR** acetaminophen  •  senna-docusate sodium **AND** polyethylene glycol **AND** bisacodyl **AND** bisacodyl  •  Calcium Gluconate-NaCl **AND** calcium gluconate IVPB **AND** Calcium  •  dextrose  •  dextrose  •  enalaprilat  •  glucagon (human recombinant)  •  magnesium sulfate **OR** magnesium sulfate **OR** magnesium sulfate  •  ondansetron **OR** ondansetron  •  potassium chloride **OR** potassium chloride **OR** potassium chloride  •  potassium phosphate infusion greater than 15 mMoles **OR** potassium phosphate infusion greater than 15 mMoles **OR** potassium phosphate **OR** sodium phosphate IVPB **OR** sodium phosphate IVPB  •  [COMPLETED] Insert peripheral IV **AND** sodium chloride  •  sodium chloride  •  sodium chloride    Allergies as of 07/06/2021   • (No Known Allergies)       Social History     Socioeconomic History   • Marital status: Single     Spouse name: Not on file   • Number of children: Not on file   • Years of education: Not on  "file   • Highest education level: Not on file   Tobacco Use   • Smoking status: Never Smoker   • Smokeless tobacco: Never Used       No family history on file.    Review of Systems   Constitutional: Negative for chills and fever.   HENT: Negative for hoarse voice and sore throat.    Eyes: Negative for double vision and photophobia.   Cardiovascular: Negative for chest pain, leg swelling, near-syncope, orthopnea, palpitations, paroxysmal nocturnal dyspnea and syncope.   Respiratory: Negative for cough and wheezing.    Skin: Negative for poor wound healing and rash.   Musculoskeletal: Negative for arthritis and joint swelling.   Gastrointestinal: Negative for bloating, abdominal pain, hematemesis and hematochezia.   Neurological: Negative for dizziness and focal weakness.   Psychiatric/Behavioral: Negative for depression and suicidal ideas.            Objective:   Temp:  [97.7 °F (36.5 °C)-98.2 °F (36.8 °C)] 97.7 °F (36.5 °C)  Heart Rate:  [63-88] 76  Resp:  [16-18] 16  BP: (141-164)/() 157/85  Body mass index is 29.39 kg/m².  Flowsheet Rows      First Filed Value   Admission Height  165.1 cm (65\") Documented at 07/06/2021 1215   Admission Weight  80.7 kg (178 lb) Documented at 07/06/2021 0908        Vitals:    07/08/21 0735   BP:    Pulse: 76   Resp:    Temp:    SpO2: 98%       Vitals reviewed.   Constitutional:       Appearance: Healthy appearance. Not in distress.   Neck:      Vascular: No JVR. JVD normal.   Pulmonary:      Effort: Pulmonary effort is normal.      Breath sounds: Normal breath sounds. No wheezing. No rhonchi. No rales.   Chest:      Chest wall: Not tender to palpatation.   Cardiovascular:      PMI at left midclavicular line. Normal rate. Regular rhythm. Normal S1. Normal S2.      Murmurs: There is no murmur.      No gallop. No click. No rub.   Pulses:     Intact distal pulses.   Edema:     Peripheral edema absent.   Abdominal:      General: Bowel sounds are normal.      Palpations: Abdomen is " soft.      Tenderness: There is no abdominal tenderness.   Musculoskeletal: Normal range of motion.         General: No tenderness. Skin:     General: Skin is warm and dry.   Neurological:      Comments: Expressive aphasia                 Lab Review:      Results from last 7 days   Lab Units 07/07/21  0340   SODIUM mmol/L 136   POTASSIUM mmol/L 3.3*   CHLORIDE mmol/L 99   CO2 mmol/L 25.1   BUN mg/dL 15   CREATININE mg/dL 0.58   CALCIUM mg/dL 9.3   BILIRUBIN mg/dL 0.4   ALK PHOS U/L 56   ALT (SGPT) U/L 16   AST (SGOT) U/L 16   GLUCOSE mg/dL 158*     Results from last 7 days   Lab Units 07/06/21  0824   TROPONIN T ng/mL <0.010     @LABRCNTbnp@  Results from last 7 days   Lab Units 07/07/21  0340 07/06/21  0824   WBC 10*3/mm3 10.83* 6.23   HEMOGLOBIN g/dL 13.8 13.7   HEMATOCRIT % 41.2 40.6   PLATELETS 10*3/mm3 338 296             @LABRCNTIP(chol,trig,hdl,ldl)    Admission EKG 07/06/2021:      I personally viewed and interpreted the patient's EKG/Telemetry data  )  Patient Active Problem List   Diagnosis   • Acute CVA (cerebrovascular accident) (CMS/[facility])     Assessment and Plan:    1. CVA -cardioembolic pattern with no abnormalities on transthoracic echocardiogram.  Plan to perform MINDY tomorrow.  Patient cannot be consented so I spoke to her son and explained the risks and benefits of the procedure.  She has no contraindications to undergoing the procedure.  He agrees to proceed tomorrow.  N.p.o. after midnight.  Case discussed with Dr. Arteaga who would like long-term monitoring.  Case also discussed with Dr. Bruner who will place a linq recorder tomorrow.  2. Hypertension  3. Diabetes    Robson So MD  07/08/21  08:22 EDT.  Time spent in reviewing chart, discussion and examination:

## 2021-07-08 NOTE — THERAPY EVALUATION
Patient Name: Ambar Bingham  : 1950    MRN: [id number]                              Today's Date: 2021       Admit Date: 2021    Visit Dx:     ICD-10-CM ICD-9-CM   1. Acute CVA (cerebrovascular accident) (CMS/[facility])  I63.9 434.91     Patient Active Problem List   Diagnosis   • Acute CVA (cerebrovascular accident) (CMS/[facility])     History reviewed. No pertinent past medical history.  History reviewed. No pertinent surgical history.  General Information     [facility] Name 21 113          Physical Therapy Time and Intention    Document Type  evaluation  -     Mode of Treatment  individual therapy;physical therapy  -     Row Name 21 113          General Information    Prior Level of Function  independent:;gait;transfer;bed mobility  -     Existing Precautions/Restrictions  fall  -     Barriers to Rehab  medically complex  -[facility] Name 21 113          Living Environment    Lives With  alone  -[facility] Name 21 113          Cognition    Orientation Status (Cognition)  oriented x 3 pt with some expressive aphasia and word finding issues  -CH     Row Name 21 113          Safety Issues, Functional Mobility    Impairments Affecting Function (Mobility)  balance;strength  -       User Key  (r) = Recorded By, (t) = Taken By, (c) = Cosigned By    Initials Name Provider Type     Chloe Bingham, PT Physical Therapist        Mobility     [facility] Name 21 113          Bed Mobility    Bed Mobility  supine-sit;sit-supine  -     Supine-Sit [place] (Bed Mobility)  verbal cues;nonverbal cues (demo/gesture);contact guard  -     Sit-Supine [place] (Bed Mobility)  not tested sitting in chair  -     Assistive Device (Bed Mobility)  head of bed elevated  -[facility] Name 21 113          Sit-Stand Transfer    Sit-Stand [place] (Transfers)  verbal cues;nonverbal cues (demo/gesture);contact guard  -[facility] Name 21 113          Gait/Stairs  (Locomotion)    [place] Level (Gait)  verbal cues;nonverbal cues (demo/gesture);contact guard  -     Distance in Feet (Gait)  150 ft + 6 ft x2  -CH     Deviations/Abnormal Patterns (Gait)  enzo decreased;gait speed decreased;base of support, wide;stride length decreased  -     Comment (Gait/Stairs)  gait slightly unsteady with wide YANA and decreased foot clearance bilaterally  -       User Key  (r) = Recorded By, (t) = Taken By, (c) = Cosigned By    Initials Name Provider Type     Chloe Bingham, PT Physical Therapist        Obj/Interventions     Row Name 07/08/21 1141          Range of Motion Comprehensive    General Range of Motion  no range of motion deficits identified  -[facility] Name 07/08/21 1141          Strength Comprehensive (MMT)    Comment, General Manual Muscle Testing (MMT) Assessment  B LE grossly 4/5, some R hand weakness compared to L when testing   -[facility] Name 07/08/21 1141          Motor Skills    Motor Skills  coordination  -[facility] Name 07/08/21 1141          Balance    Static Standing Balance  mild impairment  -     Dynamic Standing Balance  mild impairment  -       User Key  (r) = Recorded By, (t) = Taken By, (c) = Cosigned By    Initials Name Provider Type     Chloe Bingham, PT Physical Therapist        Goals/Plan     Row Name 07/08/21 1151          Bed Mobility Goal 1 (PT)    Activity/Assistive Device (Bed Mobility Goal 1, PT)  bed mobility activities, all  -CH     [place] Level/Cues Needed (Bed Mobility Goal 1, PT)  supervision required  -     Time Frame (Bed Mobility Goal 1, PT)  1 week  -[facility] Name 07/08/21 1151          Transfer Goal 1 (PT)    Activity/Assistive Device (Transfer Goal 1, PT)  transfers, all  -CH     [place] Level/Cues Needed (Transfer Goal 1, PT)  supervision required  -     Time Frame (Transfer Goal 1, PT)  1 week  -[facility] Name 07/08/21 1151          Gait Training Goal 1 (PT)    Activity/Assistive Device  (Gait Training Goal 1, PT)  gait (walking locomotion)  -     [place] Level (Gait Training Goal 1, PT)  supervision required  -     Distance (Gait Training Goal 1, PT)  300  -CH     Time Frame (Gait Training Goal 1, PT)  1 week  -       User Key  (r) = Recorded By, (t) = Taken By, (c) = Cosigned By    Initials Name Provider Type    Chloe Gallardo, PT Physical Therapist        Clinical Impression     Row Name 07/08/21 1147          Pain    Additional Documentation  Pain Scale: FACES Pre/Post-Treatment (Group)  -     Row Name 07/08/21 1147          Pain Scale: FACES Pre/Post-Treatment    Pain: FACES Scale, Pretreatment  0-->no hurt  -     Posttreatment Pain Rating  0-->no hurt  -CH     [facility] Name 07/08/21 1147          Plan of Care Review    Plan of Care Reviewed With  patient  -     Outcome Summary  Pt is a 69 yo F who was admitted with aphasia and CVA. Pt presents to PT with some impaired functional mobility and gait secondary to some R UE weakness and impaired balance. Pt may benefit from skilled PT to address strength, mobility, and gait.  -     Row Name 07/08/21 1147          Therapy Assessment/Plan (PT)    Patient/Family Therapy Goals Statement (PT)  to return to [facility]  -     Rehab Potential (PT)  good, to achieve stated therapy goals  -     Criteria for Skilled Interventions Met (PT)  skilled treatment is necessary  -     Row Name 07/08/21 1147          Positioning and Restraints    Pre-Treatment Position  in bed  -     Post Treatment Position  chair  -     In Chair  reclined;call light within reach;encouraged to call for assist;notified nsg  -       User Key  (r) = Recorded By, (t) = Taken By, (c) = Cosigned By    Initials Name Provider Type     Chloe Bingham, PT Physical Therapist        Outcome Measures     Row Name 07/08/21 1152          How much help from another person do you currently need...    Turning from your back to your side while in flat bed without using  bedrails?  3  -CH     Moving from lying on back to sitting on the side of a flat bed without bedrails?  3  -CH     Moving to and from a bed to a chair (including a wheelchair)?  3  -CH     Standing up from a chair using your arms (e.g., wheelchair, bedside chair)?  3  -CH     Climbing 3-5 steps with a railing?  3  -CH     To walk in hospital room?  3  -     AM-PAC 6 Clicks Score (PT)  18  -     Row Name 07/08/21 1152          Modified [place] Scale    Modified Cesar Scale  4 - Moderately severe disability.  Unable to walk without assistance, and unable to attend to own bodily needs without assistance.  -     Row Name 07/08/21 1152          Functional Assessment    Outcome Measure Options  AM-PAC 6 Clicks Basic Mobility (PT)  -       User Key  (r) = Recorded By, (t) = Taken By, (c) = Cosigned By    Initials Name Provider Type     Chloe Bingham PT Physical Therapist        Physical Therapy Education                 Title: PT OT SLP Therapies (In Progress)     Topic: Physical Therapy (In Progress)     Point: Mobility training (Done)     Learning Progress Summary           Patient Acceptance, E,TB,D, VU,NR by  at 7/8/2021 1153                   Point: Home exercise program (Not Started)     Learner Progress:  Not documented in this visit.          Point: Body mechanics (Done)     Learning Progress Summary           Patient Acceptance, E,TB,D, VU,NR by  at 7/8/2021 1153                   Point: Precautions (Done)     Learning Progress Summary           Patient Acceptance, E,TB,D, VU,NR by  at 7/8/2021 1153                               User Key     Initials Effective Dates Name Provider Type [facility] 06/16/21 -  Chloe Bingham PT Physical Therapist PT              PT Recommendation and Plan  Planned Therapy Interventions (PT): balance training, bed mobility training, gait training, home exercise program, patient/family education, strengthening, transfer training  Plan of Care Reviewed  With: patient  Outcome Summary: Pt is a 71 yo F who was admitted with aphasia and CVA. Pt presents to PT with some impaired functional mobility and gait secondary to some R UE weakness and impaired balance. Pt may benefit from skilled PT to address strength, mobility, and gait.     Time Calculation:   PT Charges     Row Name 07/08/21 1153             Time Calculation    Start Time  0943  -      Stop Time  1006  -CH      Time Calculation (min)  23 min  -CH      PT Received On  07/08/21  -      PT - Next Appointment  07/09/21  -         Time Calculation- PT    Total Timed Code Minutes- PT  15 minute(s)  -CH         Timed Charges    88991 - PT Therapeutic Activity Minutes  15  -CH         Untimed Charges    PT Eval/Re-eval Minutes  10  -CH         Total Minutes    Timed Charges Total Minutes  15  -CH      Untimed Charges Total Minutes  10  -CH       Total Minutes  25  -CH        User Key  (r) = Recorded By, (t) = Taken By, (c) = Cosigned By    Initials Name Provider Type     Chloe Bingham, PT Physical Therapist        Therapy Charges for Today     Code Description Service Date Service Provider Modifiers Qty    23162214035  PT THERAPEUTIC ACT EA 15 MIN 7/8/2021 Chloe Bingham, PT GP 1    64208793377  PT EVAL MOD COMPLEXITY 2 7/8/2021 Chloe Bingham, PT GP 1          PT G-Codes  Outcome Measure Options: AM-PAC 6 Clicks Basic Mobility (PT)  AM-PAC 6 Clicks Score (PT): 18  AM-PAC 6 Clicks Score (OT): 18  Modified Cesar Scale: 4 - Moderately severe disability.  Unable to walk without assistance, and unable to attend to own bodily needs without assistance.    Chloe Bingham PT  7/8/2021

## 2021-07-09 ENCOUNTER — APPOINTMENT (OUTPATIENT)
Dept: CARDIOLOGY | Facility: HOSPITAL | Age: 71
End: 2021-07-09

## 2021-07-09 LAB
BH CV ECHO MEAS - AO MAX PG (FULL): 1.3 MMHG
BH CV ECHO MEAS - AO MAX PG: 4 MMHG
BH CV ECHO MEAS - AO MEAN PG (FULL): 1 MMHG
BH CV ECHO MEAS - AO MEAN PG: 2 MMHG
BH CV ECHO MEAS - AO V2 MAX: 100 CM/SEC
BH CV ECHO MEAS - AO V2 MEAN: 70.2 CM/SEC
BH CV ECHO MEAS - AO V2 VTI: 24.4 CM
BH CV ECHO MEAS - BSA(HAYCOCK): 2 M^2
BH CV ECHO MEAS - BSA: 1.9 M^2
BH CV ECHO MEAS - BZI_BMI: 31.1 KILOGRAMS/M^2
BH CV ECHO MEAS - BZI_METRIC_HEIGHT: 165.1 CM
BH CV ECHO MEAS - BZI_METRIC_WEIGHT: 84.8 KG
BH CV ECHO MEAS - LAT PEAK E' VEL: 7.9 CM/SEC
BH CV ECHO MEAS - LV MAX PG: 2.7 MMHG
BH CV ECHO MEAS - LV MEAN PG: 1 MMHG
BH CV ECHO MEAS - LV V1 MAX: 81.7 CM/SEC
BH CV ECHO MEAS - LV V1 MEAN: 51.3 CM/SEC
BH CV ECHO MEAS - LV V1 VTI: 14.6 CM
BH CV ECHO MEAS - MED PEAK E' VEL: 5.3 CM/SEC
BH CV ECHO MEAS - MV A DUR: 0.11 SEC
BH CV ECHO MEAS - MV A MAX VEL: 85.2 CM/SEC
BH CV ECHO MEAS - MV DEC SLOPE: 710 CM/SEC^2
BH CV ECHO MEAS - MV DEC TIME: 0.25 SEC
BH CV ECHO MEAS - MV E MAX VEL: 65.8 CM/SEC
BH CV ECHO MEAS - MV E/A: 0.77
BH CV ECHO MEAS - MV MAX PG: 11.4 MMHG
BH CV ECHO MEAS - MV MEAN PG: 4 MMHG
BH CV ECHO MEAS - MV P1/2T MAX VEL: 131 CM/SEC
BH CV ECHO MEAS - MV P1/2T: 54 MSEC
BH CV ECHO MEAS - MV V2 MAX: 169 CM/SEC
BH CV ECHO MEAS - MV V2 MEAN: 94.4 CM/SEC
BH CV ECHO MEAS - MV V2 VTI: 40.5 CM
BH CV ECHO MEAS - MVA P1/2T LCG: 1.7 CM^2
BH CV ECHO MEAS - MVA(P1/2T): 4.1 CM^2
BH CV ECHO MEASUREMENTS AVERAGE E/E' RATIO: 9.97
GLUCOSE BLDC GLUCOMTR-MCNC: 106 MG/DL (ref 70–130)
GLUCOSE BLDC GLUCOMTR-MCNC: 118 MG/DL (ref 70–130)
GLUCOSE BLDC GLUCOMTR-MCNC: 137 MG/DL (ref 70–130)
GLUCOSE BLDC GLUCOMTR-MCNC: 92 MG/DL (ref 70–130)
MAXIMAL PREDICTED HEART RATE: 150 BPM
STRESS TARGET HR: 128 BPM

## 2021-07-09 PROCEDURE — 93325 DOPPLER ECHO COLOR FLOW MAPG: CPT

## 2021-07-09 PROCEDURE — 82962 GLUCOSE BLOOD TEST: CPT

## 2021-07-09 PROCEDURE — 0JH632Z INSERTION OF MONITORING DEVICE INTO CHEST SUBCUTANEOUS TISSUE AND FASCIA, PERCUTANEOUS APPROACH: ICD-10-PCS | Performed by: INTERNAL MEDICINE

## 2021-07-09 PROCEDURE — B24BZZ4 ULTRASONOGRAPHY OF HEART WITH AORTA, TRANSESOPHAGEAL: ICD-10-PCS | Performed by: INTERNAL MEDICINE

## 2021-07-09 PROCEDURE — 99233 SBSQ HOSP IP/OBS HIGH 50: CPT | Performed by: NURSE PRACTITIONER

## 2021-07-09 PROCEDURE — 93312 ECHO TRANSESOPHAGEAL: CPT

## 2021-07-09 PROCEDURE — 33285 INSJ SUBQ CAR RHYTHM MNTR: CPT | Performed by: INTERNAL MEDICINE

## 2021-07-09 PROCEDURE — 99152 MOD SED SAME PHYS/QHP 5/>YRS: CPT

## 2021-07-09 PROCEDURE — 93320 DOPPLER ECHO COMPLETE: CPT

## 2021-07-09 PROCEDURE — 93325 DOPPLER ECHO COLOR FLOW MAPG: CPT | Performed by: INTERNAL MEDICINE

## 2021-07-09 PROCEDURE — 25010000002 MIDAZOLAM PER 1 MG: Performed by: INTERNAL MEDICINE

## 2021-07-09 PROCEDURE — 97535 SELF CARE MNGMENT TRAINING: CPT

## 2021-07-09 PROCEDURE — 25010000002 FENTANYL CITRATE (PF) 50 MCG/ML SOLUTION: Performed by: INTERNAL MEDICINE

## 2021-07-09 PROCEDURE — 99153 MOD SED SAME PHYS/QHP EA: CPT

## 2021-07-09 PROCEDURE — 99232 SBSQ HOSP IP/OBS MODERATE 35: CPT | Performed by: INTERNAL MEDICINE

## 2021-07-09 PROCEDURE — 93312 ECHO TRANSESOPHAGEAL: CPT | Performed by: INTERNAL MEDICINE

## 2021-07-09 PROCEDURE — 97530 THERAPEUTIC ACTIVITIES: CPT

## 2021-07-09 PROCEDURE — C1764 EVENT RECORDER, CARDIAC: HCPCS | Performed by: INTERNAL MEDICINE

## 2021-07-09 PROCEDURE — 93320 DOPPLER ECHO COMPLETE: CPT | Performed by: INTERNAL MEDICINE

## 2021-07-09 DEVICE — ICM LP/RECRD REVEAL LINQ MEDTRONIC: Type: IMPLANTABLE DEVICE | Status: FUNCTIONAL

## 2021-07-09 RX ORDER — SODIUM CHLORIDE 9 MG/ML
INJECTION, SOLUTION INTRAVENOUS
Status: COMPLETED | OUTPATIENT
Start: 2021-07-09 | End: 2021-07-09

## 2021-07-09 RX ORDER — FENTANYL CITRATE 50 UG/ML
INJECTION, SOLUTION INTRAMUSCULAR; INTRAVENOUS
Status: COMPLETED | OUTPATIENT
Start: 2021-07-09 | End: 2021-07-09

## 2021-07-09 RX ORDER — MIDAZOLAM HYDROCHLORIDE 1 MG/ML
INJECTION INTRAMUSCULAR; INTRAVENOUS
Status: COMPLETED | OUTPATIENT
Start: 2021-07-09 | End: 2021-07-09

## 2021-07-09 RX ORDER — LIDOCAINE HYDROCHLORIDE AND EPINEPHRINE 10; 10 MG/ML; UG/ML
INJECTION, SOLUTION INFILTRATION; PERINEURAL AS NEEDED
Status: DISCONTINUED | OUTPATIENT
Start: 2021-07-09 | End: 2021-07-09 | Stop reason: HOSPADM

## 2021-07-09 RX ORDER — FLUOXETINE HYDROCHLORIDE 20 MG/1
20 CAPSULE ORAL DAILY
Status: DISCONTINUED | OUTPATIENT
Start: 2021-07-09 | End: 2021-07-10 | Stop reason: HOSPADM

## 2021-07-09 RX ADMIN — LISINOPRIL: 20 TABLET ORAL at 12:46

## 2021-07-09 RX ADMIN — SODIUM CHLORIDE 50 ML/HR: 9 INJECTION, SOLUTION INTRAVENOUS at 10:33

## 2021-07-09 RX ADMIN — DOCUSATE SODIUM 50MG AND SENNOSIDES 8.6MG 2 TABLET: 8.6; 5 TABLET, FILM COATED ORAL at 20:50

## 2021-07-09 RX ADMIN — ATORVASTATIN CALCIUM 80 MG: 80 TABLET, FILM COATED ORAL at 20:50

## 2021-07-09 RX ADMIN — MIDAZOLAM 1 MG: 1 INJECTION INTRAMUSCULAR; INTRAVENOUS at 11:07

## 2021-07-09 RX ADMIN — SODIUM CHLORIDE, PRESERVATIVE FREE 10 ML: 5 INJECTION INTRAVENOUS at 08:00

## 2021-07-09 RX ADMIN — ASPIRIN 325 MG: 325 TABLET ORAL at 12:46

## 2021-07-09 RX ADMIN — MIDAZOLAM 1 MG: 1 INJECTION INTRAMUSCULAR; INTRAVENOUS at 11:01

## 2021-07-09 RX ADMIN — FENTANYL CITRATE 25 MCG: 50 INJECTION INTRAMUSCULAR; INTRAVENOUS at 10:54

## 2021-07-09 RX ADMIN — FENTANYL CITRATE 25 MCG: 50 INJECTION INTRAMUSCULAR; INTRAVENOUS at 11:01

## 2021-07-09 RX ADMIN — LEVOTHYROXINE SODIUM 100 MCG: 0.1 TABLET ORAL at 12:46

## 2021-07-09 RX ADMIN — FENTANYL CITRATE 25 MCG: 50 INJECTION INTRAMUSCULAR; INTRAVENOUS at 10:57

## 2021-07-09 RX ADMIN — MIDAZOLAM 1 MG: 1 INJECTION INTRAMUSCULAR; INTRAVENOUS at 10:57

## 2021-07-09 RX ADMIN — MIDAZOLAM 1 MG: 1 INJECTION INTRAMUSCULAR; INTRAVENOUS at 10:54

## 2021-07-09 RX ADMIN — DOCUSATE SODIUM 50MG AND SENNOSIDES 8.6MG 2 TABLET: 8.6; 5 TABLET, FILM COATED ORAL at 12:46

## 2021-07-09 NOTE — THERAPY TREATMENT NOTE
Patient Name: Ambar Bingham  : 1950    MRN: [id number]                              Today's Date: 2021       Admit Date: 2021    Visit Dx:     ICD-10-CM ICD-9-CM   1. Acute CVA (cerebrovascular accident) (CMS/[facility])  I63.9 434.91     Patient Active Problem List   Diagnosis   • Acute CVA (cerebrovascular accident) (CMS/[facility])     History reviewed. No pertinent past medical history.  History reviewed. No pertinent surgical history.  General Information     Row Name 21 1041          OT Time and Intention    Document Type  therapy note (daily note)  -RD     Mode of Treatment  occupational therapy  -RD     Row Name 21 1041          General Information    Patient Profile Reviewed  yes  -RD     Existing Precautions/Restrictions  fall  -RD     Row Name 21 1041          Cognition    Orientation Status (Cognition)  oriented x 3  -RD       User Key  (r) = Recorded By, (t) = Taken By, (c) = Cosigned By    Initials Name Provider Type    RD Kelsie Boyle, OT Occupational Therapist          Mobility/ADL's     Row Name 21 1041          Bed Mobility    Bed Mobility  supine-sit  -RD     Supine-Sit [place] (Bed Mobility)  supervision;verbal cues  -RD     Assistive Device (Bed Mobility)  bed rails;head of bed elevated  -RD     Row Name 21 1041          Transfers    Transfers  sit-stand transfer  -RD     Sit-Stand [place] (Transfers)  contact guard;minimum assist (75% patient effort);verbal cues  -RD     Row Name 21 1041          Functional Mobility    Functional Mobility- Ind. Level  contact guard assist;verbal cues required  -RD     Functional Mobility- Device  rolling walker  -RD     Functional Mobility-Distance (Feet)  15  -RD     Functional Mobility- Comment  pt amublates from EOB > bathroom > chair using RW w/ CGA- pt does not really use RW- she picks it up rather than actually using it for assistance  -RD     Row Name 21 1041          Activities of Daily Living     BADL Assessment/Intervention  grooming;lower body dressing  -RD     Row Name 07/09/21 1041          Lower Body Dressing Assessment/Training    Comment (Lower Body Dressing)  Pt able to adjust socks w/ SBA at EOB level  -RD     Row Name 07/09/21 1041          Grooming Assessment/Training    [place] Level (Grooming)  grooming skills;hair care, combing/brushing;oral care regimen;wash face, hands;set up;standby assist  -RD     Position (Grooming)  sink side;unsupported standing  -RD     Comment (Grooming)  pt stands at sink to complete grooming tasks w/ SBA for balance  -RD       User Key  (r) = Recorded By, (t) = Taken By, (c) = Cosigned By    Initials Name Provider Type    Kelsie Roberts OT Occupational Therapist        Obj/Interventions     Row Name 07/09/21 1044          Motor Skills    Motor Skills  coordination  -RD     Row Name 07/09/21 1044          Balance    Balance Assessment  sitting static balance;standing static balance  -RD     Static Sitting Balance  WFL;sitting, edge of bed  -RD     Static Standing Balance  mild impairment;standing;unsupported  -RD     Row Name 07/09/21 1044          Therapeutic Exercise    Therapeutic Exercise  -- OT spent significant time educating pt on R UE FMC activities and therapeutic exercises for increased functional use of R UE  -RD       User Key  (r) = Recorded By, (t) = Taken By, (c) = Cosigned By    Initials Name Provider Type    Kelsie Roberts OT Occupational Therapist        Goals/Plan    No documentation.       Clinical Impression     Row Name 07/09/21 1045          Pain Scale: Numbers Pre/Post-Treatment    Pretreatment Pain Rating  0/10 - no pain  -RD     Posttreatment Pain Rating  0/10 - no pain  -RD     Row Name 07/09/21 1045          Plan of Care Review    Plan of Care Reviewed With  patient;family  -RD     Progress  improving  -RD     Outcome Summary  Pt participates well w/ OT w/ good motivation. Pt able to ambulate to bathroom and then to  chair w/ CGA. Pt stands at sink to complete grooming tasks w/ SBA for balance. Pt able to adjust socks during LB dressing at EOB level w/ SBA. OT spent significant time educating pt on R UE FMC activities and therapeutic exercises for increased functional use of R UE. Pt participates in coordination tasks including in hand manipulation tasks using remote and digit isolation tasks to  objects. Vinay continue to progress as tolerated.  -RD     Row Name 07/09/21 1045          Therapy Plan Review/Discharge Plan (OT)    Anticipated Discharge Disposition (OT)  inpatient rehabilitation facility  -RD     Row Name 07/09/21 1045          Vital Signs    O2 Delivery Pre Treatment  room air  -RD     O2 Delivery Intra Treatment  room air  -RD     O2 Delivery Post Treatment  room air  -RD     Row Name 07/09/21 1045          Positioning and Restraints    Pre-Treatment Position  in bed  -RD     Post Treatment Position  chair  -RD     In Chair  reclined;call light within reach;encouraged to call for assist;exit alarm on;with family/caregiver  -RD       User Key  (r) = Recorded By, (t) = Taken By, (c) = Cosigned By    Initials Name Provider Type    Kelsie Roberts, DAV Occupational Therapist        Outcome Measures     Row Name 07/09/21 1044          How much help from another is currently needed...    Putting on and taking off regular lower body clothing?  3  -RD     Bathing (including washing, rinsing, and drying)  3  -RD     Toileting (which includes using toilet bed pan or urinal)  3  -RD     Putting on and taking off regular upper body clothing  3  -RD     Taking care of personal grooming (such as brushing teeth)  3  -RD     Eating meals  3  -RD     AM-PAC 6 Clicks Score (OT)  18  -RD       User Key  (r) = Recorded By, (t) = Taken By, (c) = Cosigned By    Initials Name Provider Type    Kelsie Roberts OT Occupational Therapist        Occupational Therapy Education                 Title: PT OT SLP Therapies (In  Progress)     Topic: Occupational Therapy (In Progress)     Point: ADL training (Done)     Description:   Instruct learner(s) on proper safety adaptation and remediation techniques during self care or transfers.   Instruct in proper use of assistive devices.              Learning Progress Summary           Patient Acceptance, E,D, VU by RD at 7/9/2021 1045    Comment: OT spent significant time educating pt on R UE FMC activities and therapeutic exercises for increased functional use of R UE    Acceptance, E,TB, VU by BERNICE at 7/7/2021 1352   Family Acceptance, E,D, VU by RD at 7/9/2021 1045    Comment: OT spent significant time educating pt on R UE FMC activities and therapeutic exercises for increased functional use of R UE                   Point: Home exercise program (Done)     Description:   Instruct learner(s) on appropriate technique for monitoring, assisting and/or progressing therapeutic exercises/activities.              Learning Progress Summary           Patient Acceptance, E,D, VU by RD at 7/9/2021 1045    Comment: OT spent significant time educating pt on R UE FMC activities and therapeutic exercises for increased functional use of R UE   Family Acceptance, E,D, VU by RD at 7/9/2021 1045    Comment: OT spent significant time educating pt on R UE FMC activities and therapeutic exercises for increased functional use of R UE                   Point: Precautions (Not Started)     Description:   Instruct learner(s) on prescribed precautions during self-care and functional transfers.              Learner Progress:  Not documented in this visit.          Point: Body mechanics (Not Started)     Description:   Instruct learner(s) on proper positioning and spine alignment during self-care, functional mobility activities and/or exercises.              Learner Progress:  Not documented in this visit.                      User Key     Initials Effective Dates Name Provider Type Discipline    RD 06/16/21 -  [place]  Kelsie Teresa OT Occupational Therapist OT    BERNICE 06/16/21 -  Bertha Shepard OT Occupational Therapist OT              OT Recommendation and Plan     Plan of Care Review  Plan of Care Reviewed With: patient, family  Progress: improving  Outcome Summary: Pt participates well w/ OT w/ good motivation. Pt able to ambulate to bathroom and then to chair w/ CGA. Pt stands at sink to complete grooming tasks w/ SBA for balance. Pt able to adjust socks during LB dressing at EOB level w/ SBA. OT spent significant time educating pt on R UE FMC activities and therapeutic exercises for increased functional use of R UE. Pt participates in coordination tasks including in hand manipulation tasks using remote and digit isolation tasks to  objects. Vinay continue to progress as tolerated.     Time Calculation:   Time Calculation- OT     Row Name 07/09/21 1047             Time Calculation- OT    OT Start Time  0923  -RD      OT Stop Time  0951  -RD      OT Time Calculation (min)  28 min  -RD      Total Timed Code Minutes- OT  28 minute(s)  -RD      OT Received On  07/09/21  -RD      OT - Next Appointment  07/12/21  -RD         Timed Charges    91559 - OT Therapeutic Activity Minutes  15  -RD      70129 - OT Self Care/Mgmt Minutes  13  -RD         Total Minutes    Timed Charges Total Minutes  28  -RD       Total Minutes  28  -RD        User Key  (r) = Recorded By, (t) = Taken By, (c) = Cosigned By    Initials Name Provider Type    RD Kelsie Boyle OT Occupational Therapist        Therapy Charges for Today     Code Description Service Date Service Provider Modifiers Qty    95662925474 HC OT THERAPEUTIC ACT EA 15 MIN 7/9/2021 Kelsie Boyle OT GO 1    96731051130 HC OT SELF CARE/MGMT/TRAIN EA 15 MIN 7/9/2021 Kelsie Boyle OT GO 1               Kelsie Boyle OT  7/9/2021

## 2021-07-09 NOTE — SIGNIFICANT NOTE
07/09/21 1333   OTHER   Discipline physical therapist   Rehab Time/Intention   Session Not Performed patient unavailable for treatment  (Pt off the floor this AM and PM. PT to f/u tomorrow 7/10/2021.)   Recommendation   PT - Next Appointment 07/10/21

## 2021-07-09 NOTE — PROGRESS NOTES
"[facility]    Patient Name: Ambar Bingham  :1950  70 y.o.  LOS: 3  Encounter Provider: Robson So Jr, MD      Patient Care Team:  Provider, No Known as PCP - General    Chief Complaint: Follow-up CVA    Interval History: No acute issues overnight       Objective   Vital Signs  Temp:  [97.9 °F (36.6 °C)-99.1 °F (37.3 °C)] 98.1 °F (36.7 °C)  Heart Rate:  [67-90] 84  Resp:  [14-18] 17  BP: (109-159)/() 135/69    Intake/Output Summary (Last 24 hours) at 2021 1613  Last data filed at 2021 1422  Gross per 24 hour   Intake 240 ml   Output 800 ml   Net -560 ml     Flowsheet Rows      First Filed Value   Admission Height  165.1 cm (65\") Documented at 2021 1215   Admission Weight  80.7 kg (178 lb) Documented at 2021 0908            Physical Exam  Vitals reviewed.   Constitutional:       Appearance: Healthy appearance. Not in distress.   Neck:      Vascular: No JVR. JVD normal.   Pulmonary:      Effort: Pulmonary effort is normal.      Breath sounds: Normal breath sounds. No wheezing. No rhonchi. No rales.   Chest:      Chest wall: Not tender to palpatation.   Cardiovascular:      PMI at left midclavicular line. Normal rate. Regular rhythm. Normal S1. Normal S2.      Murmurs: There is no murmur.      No gallop. No click. No rub.   Pulses:     Intact distal pulses.   Edema:     Peripheral edema absent.   Abdominal:      General: Bowel sounds are normal.      Palpations: Abdomen is soft.      Tenderness: There is no abdominal tenderness.   Musculoskeletal: Normal range of motion.         General: No tenderness. Skin:     General: Skin is warm and dry.   Neurological:      General: Expressive aphasia    Pertinent Test Results:  Results from last 7 days   Lab Units 21  0816 21  0340 21  0824   SODIUM mmol/L 139 136 136   POTASSIUM mmol/L 3.8 3.3* 3.2*   CHLORIDE mmol/L 103 99 97*   CO2 mmol/L 25.5 25.1 26.3   BUN mg/dL 10 15 13   CREATININE mg/dL 0.42* 0.58 " 0.55*   GLUCOSE mg/dL 120* 158* 118*   CALCIUM mg/dL 8.7 9.3 9.8   AST (SGOT) U/L  --  16 17   ALT (SGPT) U/L  --  16 20     Results from last 7 days   Lab Units 07/06/21  0824   TROPONIN T ng/mL <0.010     Results from last 7 days   Lab Units 07/07/21  0340 07/06/21  0824   WBC 10*3/mm3 10.83* 6.23   HEMOGLOBIN g/dL 13.8 13.7   HEMATOCRIT % 41.2 40.6   PLATELETS 10*3/mm3 338 296             Results from last 7 days   Lab Units 07/07/21  0340   CHOLESTEROL mg/dL 134   TRIGLYCERIDES mg/dL 73   HDL CHOL mg/dL 50                   Medication Review:   [MAR Hold] aspirin, 325 mg, Oral, Daily   Or  [MAR Hold] aspirin, 300 mg, Rectal, Daily  [MAR Hold] atorvastatin, 80 mg, Oral, Nightly  FLUoxetine, 20 mg, Oral, Daily  [MAR Hold] insulin lispro, 0-9 Units, Subcutaneous, TID With Meals  [MAR Hold] levothyroxine, 100 mcg, Oral, Q AM  lisinopril-hydroCHLOROthiazide (ZESTORETIC) 20-12.5 mg combo dose, , Oral, Q24H  [MAR Hold] senna-docusate sodium, 2 tablet, Oral, BID  [MAR Hold] sodium chloride, 10 mL, Intravenous, Q12H  [MAR Hold] sodium chloride, 10 mL, Intravenous, Q12H              Assessment/Plan   1. CVA -cardioembolic pattern with no abnormalities on transthoracic echocardiogram.  MINDY performed with no source of cardiac emboli.  Dr. Bruner will be placing a Linq recorder today.  2. Hypertension  3. Diabetes    I will see the patient as needed.  We will plan to see the patient in clinic in 4 to 8 weeks.    Robson So Jr, MD  [facility]  07/09/21  16:13 EDT

## 2021-07-09 NOTE — PLAN OF CARE
Goal Outcome Evaluation:  Plan of Care Reviewed With: patient, family        Progress: improving  Outcome Summary: Pt participates well w/ OT w/ good motivation. Pt able to ambulate to bathroom and then to chair w/ CGA. Pt stands at sink to complete grooming tasks w/ SBA for balance. Pt able to adjust socks during LB dressing at EOB level w/ SBA. OT spent significant time educating pt on R UE FMC activities and therapeutic exercises for increased functional use of R UE. Pt participates in coordination tasks including in hand manipulation tasks using remote and digit isolation tasks to  objects. Vinay continue to progress as tolerated.

## 2021-07-09 NOTE — PLAN OF CARE
Goal Outcome Evaluation:  Plan of Care Reviewed With: patient, son        Progress: improving  Outcome Summary: Pt A&O x 4 with some expressive and receptive aphasia. Pt very pleasant and cooperative. Pt had MINDY this am and tolerated well. Pt also had loop recorder placed, recorder box at bedside. Pt can ambulate with a gait belt and assist of 1. will continue to monitor.

## 2021-07-09 NOTE — CASE MANAGEMENT/SOCIAL WORK
Continued Stay Note  [facility]     Patient Name: Ambar Bingham  MRN: [id number]  Today's Date: 7/9/2021    Admit Date: 7/6/2021    Discharge Plan     Row Name 07/09/21 1439       Plan    Plan  [facility]    Plan Comments  Spoke with [facility] has pre-cert and bed will be available on Saturday 7/10.   Patient getting loop recorder today.  CCP will continue to follow.  BHumeniuk RN Becky S. Humeniuk, RN

## 2021-07-09 NOTE — PROGRESS NOTES
[facility]      Mar/chart reviewed  Follow up Acute CVA  Some continue aphasia limits subjective  Seems to be improving though    Vital Sign Min/Max for last 24 hours  Temp  Min: 97.9 °F (36.6 °C)  Max: 99.1 °F (37.3 °C)   BP  Min: 109/67  Max: 159/94   Pulse  Min: 67  Max: 90   Resp  Min: 14  Max: 18   SpO2  Min: 91 %  Max: 100 %   No data recorded   Weight  Min: 84.8 kg (187 lb)  Max: 85.1 kg (187 lb 9.6 oz)     Appears ill, alert, unable to assess orientation   perrl, eomi, normal sclera,   mmm, no jvd, trachea midline, neck supple,  chest cta bilaterally, no crackles, no wheezes,   rrr,   soft, nt, nd +bs,  no c/c/ e  Skin warm, dry no rashes    1. Acute CVA s/p TPA -- continue care as per protocol, neuro checks  bp control. Risk factor modification, 2d echo done.  2. HTN -- goal bp less than 180/105; iv meds as needed; start po meds  3. HLD --statin  4. Hypokalemia -- replaced; repeat bmp  5. DM -- ssi;. hgba1c 6.2  6. Depression - can resume ssri     Can be discharged tomorrow if ok with all to rehab, getting loop recorder today

## 2021-07-09 NOTE — PROGRESS NOTES
"DOS: 2021  NAME: Ambar Bingham   : 1950  PCP: Provider, No Known  Chief Complaint   Patient presents with   • Neuro Deficit(s)     CC: Stroke    Subjective: No new events overnight per RN. Patient just back from [facility], reportedly unremarkable. She current denies headache or any new stroke/TIA symptoms, continues with some speech difficutly and right hand weakness/dexterity problems. Multiple family members at bedside. Patient and problem are new to examiner. History is provided by patient and review of records.   .   Interval History  History taken from: patient chart family RN    Objective:  Vital signs: /69 (BP Location: Left arm, Patient Position: Sitting)   Pulse 84   Temp 98.1 °F (36.7 °C) (Oral)   Resp 17   Ht 165.1 cm (65\")   Wt 84.8 kg (187 lb)   SpO2 93%   BMI 31.12 kg/m²       Physical Exam:  GENERAL: NAD  HEENT: Normocephalic, atraumatic   COR: RRR  Resp: Even and unlabored  Extremities: No signs of distal embolization.     Neurological:   MS: Alert. Oriented. Receptive aphasia and difficult with naming. No obvious neglect. Follows most commands, some difficulty at times, does better with mimicking.   CN: II-XII grossly normal  Motor: Normal strength and tone throughout except for decreased intrinsic hand muscles on the right.  Sensory: Intact to light touch in arms and legs  Station and Gait: Normal  Coordination: No dysmetria on finger to nose but some difficultly with instruction    Current Medications:  Scheduled Medications:[MAR Hold] aspirin, 325 mg, Oral, Daily   Or  [MAR Hold] aspirin, 300 mg, Rectal, Daily  [MAR Hold] atorvastatin, 80 mg, Oral, Nightly  FLUoxetine, 20 mg, Oral, Daily  [MAR Hold] insulin lispro, 0-9 Units, Subcutaneous, TID With Meals  [MAR Hold] levothyroxine, 100 mcg, Oral, Q AM  lisinopril-hydroCHLOROthiazide (ZESTORETIC) 20-12.5 mg combo dose, , Oral, Q24H  [MAR Hold] senna-docusate sodium, 2 tablet, Oral, BID  [MAR Hold] sodium chloride, 10 mL, " Intravenous, Q12H  [MAR Hold] sodium chloride, 10 mL, Intravenous, Q12H      Infusions:    PRN Medications:  •  [MAR Hold] acetaminophen **OR** [MAR Hold] acetaminophen  •  [MAR Hold] senna-docusate sodium **AND** [MAR Hold] polyethylene glycol **AND** [MAR Hold] bisacodyl **AND** [MAR Hold] bisacodyl  •  [MAR Hold] Calcium Gluconate-NaCl **AND** [MAR Hold] calcium gluconate IVPB **AND** Calcium  •  [MAR Hold] dextrose  •  [MAR Hold] dextrose  •  enalaprilat  •  [MAR Hold] glucagon (human recombinant)  •  [MAR Hold] magnesium sulfate **OR** [MAR Hold] magnesium sulfate **OR** [MAR Hold] magnesium sulfate  •  [MAR Hold] ondansetron **OR** [MAR Hold] ondansetron  •  potassium chloride **OR** potassium chloride **OR** potassium chloride  •  [MAR Hold] potassium phosphate infusion greater than 15 mMoles **OR** [MAR Hold] potassium phosphate infusion greater than 15 mMoles **OR** [MAR Hold] potassium phosphate **OR** [MAR Hold] sodium phosphate IVPB **OR** [MAR Hold] sodium phosphate IVPB  •  [COMPLETED] Insert peripheral IV **AND** [MAR Hold] sodium chloride  •  [MAR Hold] sodium chloride  •  [MAR Hold] sodium chloride    Medications Reviewed:     Laboratory results:  Lab Results   Component Value Date    GLUCOSE 120 (H) 07/08/2021    CALCIUM 8.7 07/08/2021     07/08/2021    K 3.8 07/08/2021    CO2 25.5 07/08/2021     07/08/2021    BUN 10 07/08/2021    CREATININE 0.42 (L) 07/08/2021    EGFRIFNONA 149 07/08/2021    BCR 23.8 07/08/2021    ANIONGAP 10.5 07/08/2021     Lab Results   Component Value Date    WBC 10.83 (H) 07/07/2021    HGB 13.8 07/07/2021    HCT 41.2 07/07/2021    MCV 90.4 07/07/2021     07/07/2021      Results from last 7 days   Lab Units 07/07/21  0340   CHOLESTEROL mg/dL 134     No results found for: INR, PROTIME  Lab Results   Component Value Date    TSH 0.280 06/21/2021     Lab Results   Component Value Date    HGBA1C 6.20 (H) 07/07/2021     Lab Results   Component Value Date    CHOL  134 07/07/2021    CHLPL 110 05/26/2020    TRIG 73 07/07/2021    HDL 50 07/07/2021    LDL 69 07/07/2021      Lab Results   Component Value Date    ZGZEOQMX12 413 06/21/2021       Results Review:     I reviewed the patient's new clinical results.  I reviewed the patient's new imaging results and agree with the interpretation.  CT head [id number]: There is an abnormal area of hypodensity involving the lateral  aspect of the left temporal and occipital lobes which measures up to  approximately 8.6 x 3.3 cm in greatest axial dimensions. The findings  are compatible with an acute infarct within the left MCA distribution.  This infarct is increased in size when compared to the prior study where  very approximate measurement of the infarct is 4.7 x 1.9 cm.  Additionally, there is a focus of subacute infarction within the left  inferior parietal lobule measuring up to 1.1 x 0.9 cm in greatest axial  dimensions. This is also within the left MCA distribution and was seen  on the prior exam as a similar-sized infarct. Finally, an acute infarct  within the left precentral gyrus is noted measuring up to 1.7 x 1.0 cm  in greatest axial dimensions and again this is within the left MCA  distribution and a similar-sized infarct was appreciated on the prior  brain MRI. There is no evidence for hemorrhagic transformation. No  significant mass effect is seen as a result of these infarcts.  Otherwise, the ventricles, sulci, and cisterns are age appropriate. The  basal ganglia and thalami are unremarkable in appearance. The posterior  fossa structures are within normal limits.  IMPRESSION:  There is interval growth of the previously identified acute  infarct involving the lateral aspect of the left temporal and occipital  lobes as discussed in detail above. Additional areas of acute infarction  involving the left inferior parietal lobule and left precentral gyrus  were similarly evident on the previous MRI of the brain. There is  no  evidence for hemorrhagic transformation at any of these sites and no  significant degree of mass effect is seen.  CTA head/neck 7/6/2021: FINDINGS: There is a bovine configuration to the great vessels. There is  mild atherosclerotic disease appreciated involving the carotid  bifurcations bilaterally, but with 0% stenosis using NASCET criteria.  There is a weblike filling defect involving the proximal aspects of the  internal carotid artery on the left posteriorly and medially. There is  no evidence of dangling clot. The distal aspects of the internal carotid  arteries and the proximal aspects of the anterior cerebral arteries and  of the right middle cerebral artery appear unremarkable. There is a  filling defect appreciated involving the inferior division of the left  middle cerebral artery. This involves a prominent branch within the  anterior aspect of the sylvian fissure. No other filling defects are  appreciated.  Both vertebral arteries were opacified. The left vertebral artery is  slightly larger than that of the right. The basilar artery and right  posterior cerebral artery appear unremarkable. There is a fetal origin  of the left posterior cerebral artery.  IMPRESSION:  1. There was no evidence of acute infarction or hemorrhage on the  noncontrasted CT examination of the brain.   2. The CT perfusion did demonstrate 33 cc of delayed perfusion involving  the inferior division of the left middle cerebral artery without  corresponding CBF abnormality.   3. Clot was identified within the inferior division of the left MCA  anteriorly within a prominent anterior sylvian branch.   4. There is mild irregularity involving the left internal carotid artery  with an adjacent thin web. There is no evidence of dangling clot.  MRI brain 7/6/2021:  FINDINGS: The diffusion sequence demonstrates a curvilinear area of  restricted diffusion involving the left parietal lobe superiolaterally,  cortical and subcortical  measuring 10 x 3 mm in size. A smaller  subcortical area of restricted diffusion involving the left occipital  lobe posteriorly is appreciated measuring approximately 7 mm in size.  There is subtle gyriform diffusion weighted hyperintensity involving the  parieto-occipital region on the left posterior laterally. There is a  questionable mild area of delayed perfusion involving the subcortical  white matter of the frontoparietal region on the left superiorly  measuring approximately 10 mm in AP dimension.  The T2 FLAIR sequence demonstrates increased signal intensity within  branches of the left MCA consistent with slow flow. There is minimal  diffusion hyperintensity related to the area of suspected acute  infarction involving the left parietal lobe. There was no evidence of  hemorrhage.  IMPRESSION:  1. Small areas of restricted diffusion consistent with areas of  infarction are noted, as well as subtle diffusion weighted  hyperintensity involving the parieto-occipital cortex posterior  laterally. There is only minimal diffusion hyperintensity appreciated.   2. There is increased signal intensity present within the branches of  the inferior division of the left MCA consistent with slow flow evident  on the axial T2 FLAIR sequence. The above information was discussed with  Dr. Arzate while the patient was still in the MRI scanner.  TTE 7/6/2021:Interpretation Summary  · Estimated left ventricular EF = 66% Estimated left ventricular EF was in agreement with the calculated left ventricular EF. Left ventricular systolic function is normal.  · Left ventricular diastolic function was normal.  · Saline test results are negative.  · Estimated right ventricular systolic pressure from tricuspid regurgitation is normal (<35 mmHg).        [facility]Y 7/9/2021: Report pending     Impression: Ms. Bingham is a 69 yo female with HTN, DM who presented on 7/5 with aphasia, was found to have large area of penumbra in the left temporal lobe  with team D MRI showing areas of DWI restriction with no significant T2 flair correlation.  She received IV TPA.    Ffjy-ei-kj-date:  -CT head: No acute findings  -CTA H/N: No flow-limiting stenosis but some concern for weblike area possible thrombus in the left carotid artery which may be original source  -MRI brain: Small areas of restricted diffusion in the left temporal lobe, no abnormalities on GRE sequence  -TTE: LVEF 66%, LA normal size, saline test negative  -Labs: Hemoglobin A1c 6.2; LDL 69;     Diagnoses:   LMCA stroke, Embolic with residual Wernicke's aphasia  Cryptogenic stroke  Hypertension    Plan:  MINDY done today, apparently it was unrevealing; however official report pending  Plans for ILR placement later today  Continue ASA, statin  Hydrate  Neurochecks  EKG Tele  PT/OT/ST  Stroke Education  F/U with outpatient neurology in about 3 months      I have discussed the above with patient, RN, and Dr. Arzate who agrees with plan.   Ebonie Mitchell, VAIBHAV  07/09/21  15:39 EDT        Acute CVA (cerebrovascular accident) (CMS/[facility])

## 2021-07-10 VITALS
TEMPERATURE: 97.6 F | HEART RATE: 85 BPM | BODY MASS INDEX: 31.16 KG/M2 | RESPIRATION RATE: 16 BRPM | OXYGEN SATURATION: 98 % | SYSTOLIC BLOOD PRESSURE: 125 MMHG | DIASTOLIC BLOOD PRESSURE: 72 MMHG | WEIGHT: 187 LBS | HEIGHT: 65 IN

## 2021-07-10 LAB
GLUCOSE BLDC GLUCOMTR-MCNC: 105 MG/DL (ref 70–130)
GLUCOSE BLDC GLUCOMTR-MCNC: 109 MG/DL (ref 70–130)
GLUCOSE BLDC GLUCOMTR-MCNC: 110 MG/DL (ref 70–130)
GLUCOSE BLDC GLUCOMTR-MCNC: 121 MG/DL (ref 70–130)

## 2021-07-10 PROCEDURE — 97116 GAIT TRAINING THERAPY: CPT

## 2021-07-10 PROCEDURE — 82962 GLUCOSE BLOOD TEST: CPT

## 2021-07-10 RX ORDER — ASPIRIN 325 MG
325 TABLET ORAL DAILY
Qty: 30 TABLET | Refills: 0 | Status: SHIPPED | OUTPATIENT
Start: 2021-07-11

## 2021-07-10 RX ORDER — ATORVASTATIN CALCIUM 80 MG/1
80 TABLET, FILM COATED ORAL NIGHTLY
Qty: 30 TABLET | Refills: 0 | Status: SHIPPED | OUTPATIENT
Start: 2021-07-10

## 2021-07-10 RX ADMIN — FLUOXETINE HYDROCHLORIDE 20 MG: 20 CAPSULE ORAL at 09:28

## 2021-07-10 RX ADMIN — DOCUSATE SODIUM 50MG AND SENNOSIDES 8.6MG 2 TABLET: 8.6; 5 TABLET, FILM COATED ORAL at 09:33

## 2021-07-10 RX ADMIN — SODIUM CHLORIDE, PRESERVATIVE FREE 10 ML: 5 INJECTION INTRAVENOUS at 09:31

## 2021-07-10 RX ADMIN — LISINOPRIL: 20 TABLET ORAL at 09:28

## 2021-07-10 RX ADMIN — LEVOTHYROXINE SODIUM 100 MCG: 0.1 TABLET ORAL at 05:24

## 2021-07-10 RX ADMIN — ACETAMINOPHEN 650 MG: 325 TABLET, FILM COATED ORAL at 09:28

## 2021-07-10 RX ADMIN — ASPIRIN 325 MG: 325 TABLET ORAL at 09:28

## 2021-07-10 NOTE — PLAN OF CARE
Goal Outcome Evaluation:  Plan of Care Reviewed With: patient        Progress: improving  Outcome Summary: Pt motivated to participate with PT this date and tolerates 150 ft ambulation with CGA with decreased assist during sit to stand transfers. Noted mild unsteadiness during ambulation however no overt LOB and pt uses RW for safety. Cues required for RW navigation, specifically when walking into bathroom/performing transfer to toilet. She remains appropriate for skilled PT services.Patient was intermittently wearing a face mask during this therapy encounter. Therapist used appropriate personal protective equipment including eye protection, mask, and gloves.  Mask used was standard procedure mask. Appropriate PPE was worn during the entire therapy session. Hand hygiene was completed before and after therapy session. Patient is not in enhanced droplet precautions.

## 2021-07-10 NOTE — THERAPY TREATMENT NOTE
Patient Name: Ambar Bingham  : 1950    MRN: [id number]                              Today's Date: 7/10/2021       Admit Date: 2021    Visit Dx:     ICD-10-CM ICD-9-CM   1. Acute CVA (cerebrovascular accident) (CMS/[facility])  I63.9 434.91     Patient Active Problem List   Diagnosis   • Acute CVA (cerebrovascular accident) (CMS/[facility])     History reviewed. No pertinent past medical history.  History reviewed. No pertinent surgical history.  General Information     Row Name 07/10/21 1204          Physical Therapy Time and Intention    Document Type  therapy note (daily note)  -RS     Mode of Treatment  physical therapy;individual therapy  -RS     Row Name 07/10/21 1204          General Information    Patient Profile Reviewed  yes  -RS     Existing Precautions/Restrictions  fall  -RS       User Key  (r) = Recorded By, (t) = Taken By, (c) = Cosigned By    Initials Name Provider Type    RS Kriss Luna PT Physical Therapist        Mobility     Row Name 07/10/21 1204          Bed Mobility    Comment (Bed Mobility)  pt up in chair  -RS     Row Name 07/10/21 1204          Sit-Stand Transfer    Sit-Stand [place] (Transfers)  contact guard  -RS     Assistive Device (Sit-Stand Transfers)  walker, front-wheeled  -RS     Row Name 07/10/21 1204          Gait/Stairs (Locomotion)    [place] Level (Gait)  verbal cues;nonverbal cues (demo/gesture);contact guard  -RS     Assistive Device (Gait)  walker, front-wheeled  -RS     Distance in Feet (Gait)  10ft, 150 feet  -RS     Deviations/Abnormal Patterns (Gait)  enzo decreased;gait speed decreased;base of support, wide;stride length decreased  -RS     Comment (Gait/Stairs)  inc lateral trunk lean, wide YANA, slightly unsteady but no overt LOB with RW use  -RS       User Key  (r) = Recorded By, (t) = Taken By, (c) = Cosigned By    Initials Name Provider Type    RS Kriss Luna PT Physical Therapist        Obj/Interventions    No documentation.       Goals/Plan   "  No documentation.       Clinical Impression     Row Name 07/10/21 1205          Pain Scale: FACES Pre/Post-Treatment    Pre/Posttreatment Pain Comment  pt reports L hip pain, when asked to rate states \"it just hurts but not too bad\"  -RS     Row Name 07/10/21 1205          Plan of Care Review    Plan of Care Reviewed With  patient  -RS     Progress  improving  -RS     Outcome Summary  Pt motivated to participate with PT this date and tolerates 150 ft ambulation with CGA with decreased assist during sit to stand transfers. Noted mild unsteadiness during ambulation however no overt LOB and pt uses RW for safety. Cues required for RW navigation, specifically when walking into bathroom/performing transfer to toilet. She remains appropriate for skilled PT services.  -RS     Row Name 07/10/21 1205          Positioning and Restraints    Pre-Treatment Position  sitting in chair/recliner  -RS     Post Treatment Position  chair  -RS     In Chair  reclined;call light within reach;encouraged to call for assist;with other staff  -RS       User Key  (r) = Recorded By, (t) = Taken By, (c) = Cosigned By    Initials Name Provider Type    RS Kriss Luna, PT Physical Therapist        Outcome Measures     Row Name 07/10/21 1207          How much help from another person do you currently need...    Turning from your back to your side while in flat bed without using bedrails?  3  -RS     Moving from lying on back to sitting on the side of a flat bed without bedrails?  3  -RS     Moving to and from a bed to a chair (including a wheelchair)?  3  -RS     Standing up from a chair using your arms (e.g., wheelchair, bedside chair)?  3  -RS     Climbing 3-5 steps with a railing?  3  -RS     To walk in hospital room?  3  -RS     AM-PAC 6 Clicks Score (PT)  18  -RS     Row Name 07/10/21 1207          Functional Assessment    Outcome Measure Options  AM-PAC 6 Clicks Basic Mobility (PT)  -RS       User Key  (r) = Recorded By, (t) = Taken " By, (c) = Cosigned By    Initials Name Provider Type     Kriss Luna, PT Physical Therapist        Physical Therapy Education                 Title: PT OT SLP Therapies (In Progress)     Topic: Physical Therapy (Done)     Point: Mobility training (Done)     Learning Progress Summary           Patient Eager, E,D, VU,NR by  at 7/10/2021 1208    Acceptance, E,TB,D, VU,NR by  at 7/8/2021 1153                   Point: Home exercise program (Done)     Learning Progress Summary           Patient Eager, E,D, VU,NR by  at 7/10/2021 1208                   Point: Body mechanics (Done)     Learning Progress Summary           Patient Eager, E,D, VU,NR by  at 7/10/2021 1208    Acceptance, E,TB,D, VU,NR by  at 7/8/2021 1153                   Point: Precautions (Done)     Learning Progress Summary           Patient Eager, E,D, VU,NR by  at 7/10/2021 1208    Acceptance, E,TB,D, VU,NR by  at 7/8/2021 1153                               User Key     Initials Effective Dates Name Provider Type Discipline     06/16/21 -  Chloe Bingham PT Physical Therapist PT     06/16/21 -  Kriss Luna PT Physical Therapist PT              PT Recommendation and Plan     Plan of Care Reviewed With: patient  Progress: improving  Outcome Summary: Pt motivated to participate with PT this date and tolerates 150 ft ambulation with CGA with decreased assist during sit to stand transfers. Noted mild unsteadiness during ambulation however no overt LOB and pt uses RW for safety. Cues required for RW navigation, specifically when walking into bathroom/performing transfer to toilet. She remains appropriate for skilled PT services.     Time Calculation:   PT Charges     Row Name 07/10/21 1204             Time Calculation    Start Time  1114  -RS      Stop Time  1130  -RS      Time Calculation (min)  16 min  -RS         Timed Charges    97788 - Gait Training Minutes   16  -RS         Total Minutes    Timed Charges Total Minutes   16  -RS       Total Minutes  16  -RS        User Key  (r) = Recorded By, (t) = Taken By, (c) = Cosigned By    Initials Name Provider Type    RS Kriss Luna, PT Physical Therapist        Therapy Charges for Today     Code Description Service Date Service Provider Modifiers Qty    10594610244 HC GAIT TRAINING EA 15 MIN 7/10/2021 Kriss Luna, PT GP 1          PT G-Codes  Outcome Measure Options: AM-PAC 6 Clicks Basic Mobility (PT)  AM-PAC 6 Clicks Score (PT): 18  AM-PAC 6 Clicks Score (OT): 18  Modified Sibley Scale: 4 - Moderately severe disability.  Unable to walk without assistance, and unable to attend to own bodily needs without assistance.    Kriss Luna PT  7/10/2021

## 2021-07-10 NOTE — DISCHARGE SUMMARY
Date of Discharge:  7/10/2021    Discharge Diagnoses:  1. Left MCA CVA likely embolic with Warnicke's aphasia  2. Hypertension  3. Depression  4. Diabetes mellitus type 2      Hospital Course  Patient is a 70 y.o. female presented with stroke symptoms she received TPA she has a persistent Warnicke's type expressive aphasia etiology of her stroke is unclear suggestive of cardioembolic MINDY did not reveal any definite cardiac source she had a implantable loop recorder placed and she is to follow-up with cardiology after this she is going to stay on aspirin high-dose statins should follow-up with neurology in 3 months she is going to [facility] for continued therapy in addition to her aphasia she has a little bit of weakness in the right hand.      Procedures Performed  Procedure(s):  Loop insertion---[employer]       Consults:   Consults     Date and Time Order Name Status Description    7/8/2021  7:12 AM Inpatient Cardiology Consult Completed     7/6/2021  9:52 AM Pulmonology (on-call MD unless specified) Completed           Pertinent Test Results:   Labs:  Results from last 7 days   Lab Units 07/08/21  0816 07/07/21  0340 07/06/21  0824   GLUCOSE mg/dL 120* 158* 118*   SODIUM mmol/L 139 136 136   POTASSIUM mmol/L 3.8 3.3* 3.2*   CO2 mmol/L 25.5 25.1 26.3   CHLORIDE mmol/L 103 99 97*   ANION GAP mmol/L 10.5 11.9 12.7   CREATININE mg/dL 0.42* 0.58 0.55*   BUN mg/dL 10 15 13   BUN / CREAT RATIO  23.8 25.9* 23.6   CALCIUM mg/dL 8.7 9.3 9.8   EGFR IF NONAFRICN AM mL/min/1.73 149 103 109   ALK PHOS U/L  --  56 61   TOTAL PROTEIN g/dL  --  7.0 7.6   ALT (SGPT) U/L  --  16 20   AST (SGOT) U/L  --  16 17   BILIRUBIN mg/dL  --  0.4 0.5   ALBUMIN g/dL  --  3.90 4.40   GLOBULIN gm/dL  --  3.1 3.2     Estimated Creatinine Clearance: 70.3 mL/min (A) (by C-G formula based on SCr of 0.42 mg/dL (L)).      Results from last 7 days   Lab Units 07/07/21  0340 07/06/21  0824   WBC 10*3/mm3 10.83* 6.23   RBC 10*6/mm3 4.56 4.63      HEMOGLOBIN g/dL 13.8 13.7   HEMATOCRIT % 41.2 40.6   MCV fL 90.4 87.7   MCH pg 30.3 29.6   MCHC g/dL 33.5 33.7   RDW % 12.1* 12.1*   RDW-SD fl 40.1 38.8   MPV fL 9.8 9.6   PLATELETS 10*3/mm3 338 296   NEUTROPHIL % % 86.4* 47.8   LYMPHOCYTE % % 10.3* 37.7   MONOCYTES % % 2.7* 8.2   EOSINOPHIL % % 0.0* 5.0   BASOPHIL % % 0.1 1.1   IMM GRAN % % 0.5 0.2   NEUTROS ABS 10*3/mm3 9.36* 2.98   LYMPHS ABS 10*3/mm3 1.12 2.35   MONOS ABS 10*3/mm3 0.29 0.51   EOS ABS 10*3/mm3 0.00 0.31   BASOS ABS 10*3/mm3 0.01 0.07   IMMATURE GRANS (ABS) 10*3/mm3 0.05 0.01   NRBC /100 WBC 0.0 0.0         Results from last 7 days   Lab Units 07/06/21  0824   TROPONIN T ng/mL <0.010                       Imaging Results (Last 72 Hours)     ** No results found for the last 72 hours. **        Results for orders placed during the hospital encounter of 07/06/21    Adult Transesophageal Echo (MINDY) W/ Cont if Necessary Per Protocol    Interpretation Summary  · Left ventricular ejection fraction appears to be 56 - 60%. Left ventricular systolic function is normal. Normal left ventricular cavity size noted. Left ventricular wall thickness is consistent with mild concentric hypertrophy. All left ventricular wall segments contract normally. Left ventricular diastolic function is consistent with (grade I) impaired relaxation.  · No evidence of a left atrial appendage thrombus was present. No evidence of a patent foramen ovale. No evidence of an atrial septal defect present. Saline test results are negative.  · The left atrial cavity is mild to moderately dilated. No evidence of left atrial thrombus or mass present.  · No dilation of the aortic root is present. No dilation of the ascending aorta is present. There is mild plaque in the ascending aorta present. No dilation of the aortic arch is present. There is mild plaque in the aortic arch present. No dilation of the descending aorta present. There is moderate plaque in the descending aorta  present.          Condition on Discharge: Stable    Vital Signs  Temp:  [97.6 °F (36.4 °C)-98.6 °F (37 °C)] 97.6 °F (36.4 °C)  Heart Rate:  [72-85] 85  Resp:  [16-17] 16  BP: (115-134)/(59-85) 125/72    Physical Exam:  General Appearance: Well-developed white female she sitting up in a chair does not appear in acute distress  Eyes: Conjunctiva are clear and anicteric pupils are equal round and reactive to light extraocular muscles are intact  ENT: No facial symmetry tongue is midline mucous membranes are moist no erythema or exudates, nasal septum midline  Neck: No adenopathy no jugular venous tension trachea midline  Lungs: Clear nonlabored symmetric expansion  Cardiac: Regular rate rhythm no murmur no gallop she has a small left anterior chest incision is closed clean and dry  Abdomen: Soft nontender no palpable hepatosplenomegaly or masses  : Not examined  Musc/Skel: Grossly normal  Skin: No jaundice no petechiae skin is warm and dry  Neuro: Patient is awake and alert she has a definite expressive aphasia she has good dorsiflexion plantarflexion and straight leg raise bilaterally she has no pronator drift and does finger-nose to finger without difficulty bilaterally she has a little bit weaker  on the right versus the left.  Extremities/P Vascular: No clubbing no cyanosis no edema palpable radial and dorsalis pedis pulses bilaterally  MSE: Seems to be in good spirits      Discharge Disposition  Skilled Nursing Facility (DC - External)    Discharge Medications     Discharge Medications      New Medications      Instructions Start Date   aspirin 325 MG tablet   325 mg, Oral, Daily   Start Date: July 11, 2021     atorvastatin 80 MG tablet  Commonly known as: LIPITOR   80 mg, Oral, Nightly         Continue These Medications      Instructions Start Date   FLUoxetine 20 MG capsule  Commonly known as: PROzac   20 mg, Oral, Daily      levothyroxine 100 MCG tablet  Commonly known as: SYNTHROID, LEVOTHROID   100 mcg,  Oral, Daily      lisinopril-hydrochlorothiazide 20-12.5 MG per tablet  Commonly known as: PRINZIDE,ZESTORETIC   1 tablet, Oral, Daily      metFORMIN 1000 MG tablet  Commonly known as: GLUCOPHAGE   1,000 mg, Oral, 2 Times Daily With Meals      Vitamin D (Cholecalciferol) 50 MCG (2000 UT) capsule   400 Units, Oral, Daily         Stop These Medications    amLODIPine 2.5 MG tablet  Commonly known as: NORVASC     simvastatin 40 MG tablet  Commonly known as: ZOCOR     solifenacin 5 MG tablet  Commonly known as: VESICARE        Did complete a 4 page FMLA form for her    Discharge Diet: Healthy heart she needs full PT OT and speech therapy    Activity at Discharge:     Follow-up Appointments  No future appointments.      Test Results Pending at Discharge       Cain Evans MD  07/10/21  19:13 EDT    Time: I spent over 40 minutes on her discharge

## 2021-07-10 NOTE — PLAN OF CARE
Goal Outcome Evaluation:  Plan of Care Reviewed With: patient        Progress: improving  Outcome Summary: Rested well overnight. Denies pain. NIH 1. Ambulating with 1 assist to bathroom. Aphasia continues. Possible d/c to rehab today. Continue to monitor.

## 2021-07-12 NOTE — PAYOR COMM NOTE
"DISCHARGED    REF #[id number]        Ambar Bingham (70 y.o. Female)     Date of Birth Social Security Number Address Home Phone MRN    1950  [id number] [address] [phone] [id number]    Yarsani Marital Status          [employer] [facility]       Admission Date Admission Type Admitting Provider Attending Provider Department, Room/Bed    7/6/21 Emergency Skinny Gautam MD  [address], P587/1    Discharge Date Discharge Disposition Discharge Destination        7/10/2021 Skilled Nursing Facility (DC - External)              Attending Provider: (none)   Allergies: T-pa [Alteplase]    Isolation: None   Infection: None   Code Status: Prior    Ht: 165.1 cm (65\")   Wt: 84.8 kg (187 lb)    Admission Cmt: None   Principal Problem: None                Active Insurance as of 7/6/2021     Primary Coverage     Payor Plan Insurance Group Employer/Plan Group    ANTHEM BLUE CROSS ANTHEM BLUE CROSS BLUE SHIELD PPO [id number]     Payor Plan Address Payor Plan Phone Number Payor Plan Fax Number Effective Dates    [address] [phone]  1/1/2021 - None Entered    [facility] [id number]       Subscriber Name Subscriber Birth Date Member ID       AMBAR BINGHAM 1950 [id number]           Secondary Coverage     Payor Plan Insurance Group Employer/Plan Group    MEDICARE MEDICARE A ONLY      Payor Plan Address Payor Plan Phone Number Payor Plan Fax Number Effective Dates    [address] [phone]  11/1/2015 - None Entered    [facility] [id number]       Subscriber Name Subscriber Birth Date Member ID       AMBAR BINGHAM 1950 [id number]                 Emergency Contacts      (Rel.) Home Phone Work Phone Mobile Phone    RAUL BINGHAM (Son) -- -- [phone]    Janie Rock (Sister) -- -- [phone]            Discharge Order (From admission, onward)     Start     Ordered    07/10/21 1912  Discharge patient  Once     Expected Discharge Date: 07/10/21    Expected Discharge " Time: Evening    Discharge Disposition: Skilled Nursing Facility (DC - External)    Physician of Record for Attribution - Please select from Treatment Team: KINGA BOLIVAR [[id number]]    Review needed by CMO to determine Physician of Record: No       Question Answer Comment   Physician of Record for Attribution - Please select from Treatment Team KINGA BOLIVAR    Review needed by CMO to determine Physician of Record No        07/10/21 0002

## 2021-07-20 LAB — CREAT BLDA-MCNC: 0.6 MG/DL (ref 0.6–1.3)

## 2021-07-26 ENCOUNTER — HOME CARE VISIT (OUTPATIENT)
Dept: HOME HEALTH SERVICES | Facility: HOME HEALTHCARE | Age: 71
End: 2021-07-26

## 2021-07-27 ENCOUNTER — TRANSCRIBE ORDERS (OUTPATIENT)
Dept: HOME HEALTH SERVICES | Facility: HOME HEALTHCARE | Age: 71
End: 2021-07-27

## 2021-07-27 ENCOUNTER — HOME HEALTH ADMISSION (OUTPATIENT)
Dept: HOME HEALTH SERVICES | Facility: HOME HEALTHCARE | Age: 71
End: 2021-07-27

## 2021-07-27 DIAGNOSIS — I10 ESSENTIAL HYPERTENSION, MALIGNANT: Primary | ICD-10-CM

## 2021-07-28 ENCOUNTER — HOME CARE VISIT (OUTPATIENT)
Dept: HOME HEALTH SERVICES | Facility: HOME HEALTHCARE | Age: 71
End: 2021-07-28

## 2021-07-28 VITALS
OXYGEN SATURATION: 98 % | DIASTOLIC BLOOD PRESSURE: 88 MMHG | RESPIRATION RATE: 18 BRPM | TEMPERATURE: 98 F | SYSTOLIC BLOOD PRESSURE: 144 MMHG | HEART RATE: 74 BPM

## 2021-07-28 VITALS
SYSTOLIC BLOOD PRESSURE: 124 MMHG | RESPIRATION RATE: 16 BRPM | TEMPERATURE: 98 F | OXYGEN SATURATION: 96 % | HEART RATE: 80 BPM | DIASTOLIC BLOOD PRESSURE: 80 MMHG

## 2021-07-28 VITALS — DIASTOLIC BLOOD PRESSURE: 88 MMHG | SYSTOLIC BLOOD PRESSURE: 148 MMHG | OXYGEN SATURATION: 98 % | HEART RATE: 74 BPM

## 2021-07-28 PROCEDURE — G0299 HHS/HOSPICE OF RN EA 15 MIN: HCPCS

## 2021-07-28 PROCEDURE — G0153 HHCP-SVS OF S/L PATH,EA 15MN: HCPCS

## 2021-07-28 PROCEDURE — G0151 HHCP-SERV OF PT,EA 15 MIN: HCPCS

## 2021-07-28 NOTE — CASE COMMUNICATION
1. Caller Name: Art - pt                                          Call Back Number: [phone]       Patient  called and is wondering what's the antibiotic for that was prescribe to her on her last visit 02/22/2019. Also patient is wondering what her  lab results are.    Medication prescribe on 02/22/2019 is: metronidazole (METROGEL) 0.75 % gel    Please advise   HUD SOC – HUDDLE START OF CARE    Caregiver Status: yes  Availability: 24/7  Ability to meet patient's needs:yes  Willingness:  yes  Risk for Hospitalization: 4    Patient's goal(s): to get back to normal and work    Services required to achieve goals: pt/ot/sn/speech    Potential Issues for goal attainment: none    Discipline Assessment Updates:     Problems identified:none    Disease processes:cva  Acuity and severity:4  Comorbidities:dm, htn  Level of independence with management:     Knowledge deficits: medications  Current conditions:  Medications: needs teaching and to get all meds in the home  Care procedures:    Functional status and safety:    Mobility:    Self-care:     ADLs:     Any Duplication of Services:no    Environmental issues:  physical environment:  set up/compatibility with patients needs  accessibility and safety    Equipment/Adjunct Services:  Devices for care present in the home:  Devices needed and not present:     Community resources involved/needed:  Dialysis:  Transportation:   Meals on Wheels:    Any additional needs:    Based upon record review and collaboration conference, the recommended frequency for this patient is:     SN-----yes    PT-----yes    OT----yes    ST-----yes    HHA---    MSW---         Please note that above is a recommendation only and that the plan of care should reflect the patient's clinical needs and goals. If in agreement, please complete note. If a different frequency is necessary, please explain in note box and proceed per patients clinical needs.

## 2021-07-28 NOTE — HOME HEALTH
"PHYSICAL THERAPY EVALUATION    REASON FOR REFERRAL:  aftercare following CVA   DIAGNOSIS:  L MCA CVA likely embolic with Warnicke's aphasia, s/p mechanical emblecomtom  PERTINENT MEDICAL HISTORY: HTN, DM2, depression, hypothyroidism.   PRIOR LEVEL OF FUNCTION:  independent, driving, working as the  at [employer]. Son lives with her but did not need to help her prior to this hospital stay.   MENTAL STATUS: AOx3      OBJECTIVE FINDINGS  WOUND / SKIN CONDITION:  intact   SIGNS SYMPTOMS OF INFECTION: none   EDEMA:  none   POSTURE: symmetrical   COORDINATION REFLEXES:  intact grossly   SENSATION PROPRIOCEPTION: intact grossly   PAIN RATING:   none          FUNCTIONAL ASSESSMENT    BED MOBILITY:  independent       TRANSFER DEFICITS: independent           BALANCE: GOOD   GAIT ANALYSIS (including WB status, distance, AD used and assistance needed) WBAT, pt amb at least 200ft without device, no signs of LOB, I   TIMED UP AND GO (TUG):  9.5  30 SECOND STS:  12 reps          MUSCULOSKELETAL STATUS  RANGE OF MOTION DEFICITS:  UE and LE WNL   STRENGTH DEFICITS:  B LE WNL      ASSESSMENT: Pt is a pleasant female who stated she wasn't feeling well the day she went to hospital and was given TPN for CVA. She now presents with aphasia. Therapist did not observe any functional mobility deficits at this time. Encourage pt to focus on maintaining current level of function including resuming household activity, ambulating every 1-2 hours during the day, and primarily focussing on ST at this time.        PATIENT / CAREGIVER AGREE WITH DISCHARGE PLAN: yes.   PATIENT'S GOAL:  “return to work\"   HOME SOCIAL ENVIRONMENT:  Lives in a 1 story home. No steps. Son works night shift.   COMMUNICATION / CARE COORDINATION   Report to ST."

## 2021-07-28 NOTE — HOME HEALTH
PLAN FOR NEXT VISIT  DX STROKE, CONTIUNE EDU S/S TO LOOK FOR   MAKE SURE PATIENT HAS ASA, LIPITOR, B12 AND IRON FROM THE PHARMACY AND IS TAKING AS INSTRUCTED   FALLS RISK, INSPECT SKIN FOR BREAKDOWN  MAKE SURE PATIENT HAS CARDIAC MONITOR- LOOP MONITOR ,

## 2021-07-29 ENCOUNTER — HOME CARE VISIT (OUTPATIENT)
Dept: HOME HEALTH SERVICES | Facility: HOME HEALTHCARE | Age: 71
End: 2021-07-29

## 2021-07-29 VITALS
HEART RATE: 86 BPM | TEMPERATURE: 97.1 F | OXYGEN SATURATION: 93 % | SYSTOLIC BLOOD PRESSURE: 130 MMHG | DIASTOLIC BLOOD PRESSURE: 78 MMHG

## 2021-07-29 PROCEDURE — G0152 HHCP-SERV OF OT,EA 15 MIN: HCPCS

## 2021-07-29 NOTE — CASE COMMUNICATION
CALLED CHRIS SHULTZ NP OFFICE SPOKE WITH DILIA MYERS ORDERS SHE IS OK WITH NURSING, OT, PT AND SPEECH

## 2021-07-29 NOTE — HOME HEALTH
"CURRENT SITUATION: CVA w/aphasia. DM-II, HLD, HTN, Vit-B deficiency, iron deficiency.  CHIEF COMPLAINT: speech  SUBJECTIVE: Agreeable to OT evaluation.  PLOF: Independent w/all tasks, driving, working FT at [employer] as .  SOCIAL/ENVIRONMENT: Pt lives in  home w/2 steps to enter, no rail. Has a dog to care for. Sister present and supportive.  PATIENT'S/CAREGIVER'S GOAL: \"I want to talk better so I can go back to work.\"  NEXT M.D. APPT: 08/03/21 PCP. 08/18/21 cardiology  REHAB POTENTIAL: good  D/C PLAN: to self w/HEP adn MD f/u prn."

## 2021-07-30 ENCOUNTER — HOME CARE VISIT (OUTPATIENT)
Dept: HOME HEALTH SERVICES | Facility: HOME HEALTHCARE | Age: 71
End: 2021-07-30

## 2021-07-30 PROCEDURE — G2066 INTER DEVC REMOTE 30D: HCPCS | Performed by: INTERNAL MEDICINE

## 2021-07-30 PROCEDURE — G0153 HHCP-SVS OF S/L PATH,EA 15MN: HCPCS

## 2021-07-30 PROCEDURE — 93298 REM INTERROG DEV EVAL SCRMS: CPT | Performed by: INTERNAL MEDICINE

## 2021-08-01 VITALS
TEMPERATURE: 97.9 F | HEART RATE: 72 BPM | SYSTOLIC BLOOD PRESSURE: 158 MMHG | OXYGEN SATURATION: 96 % | DIASTOLIC BLOOD PRESSURE: 94 MMHG | RESPIRATION RATE: 18 BRPM

## 2021-08-02 ENCOUNTER — HOME CARE VISIT (OUTPATIENT)
Dept: HOME HEALTH SERVICES | Facility: HOME HEALTHCARE | Age: 71
End: 2021-08-02

## 2021-08-02 VITALS
RESPIRATION RATE: 18 BRPM | DIASTOLIC BLOOD PRESSURE: 84 MMHG | TEMPERATURE: 97.3 F | OXYGEN SATURATION: 96 % | SYSTOLIC BLOOD PRESSURE: 122 MMHG | HEART RATE: 74 BPM

## 2021-08-02 PROCEDURE — G0153 HHCP-SVS OF S/L PATH,EA 15MN: HCPCS

## 2021-08-04 ENCOUNTER — HOME CARE VISIT (OUTPATIENT)
Dept: HOME HEALTH SERVICES | Facility: HOME HEALTHCARE | Age: 71
End: 2021-08-04

## 2021-08-04 VITALS
TEMPERATURE: 98.9 F | RESPIRATION RATE: 20 BRPM | HEART RATE: 74 BPM | SYSTOLIC BLOOD PRESSURE: 132 MMHG | OXYGEN SATURATION: 98 % | DIASTOLIC BLOOD PRESSURE: 64 MMHG

## 2021-08-04 PROCEDURE — G0299 HHS/HOSPICE OF RN EA 15 MIN: HCPCS

## 2021-08-05 ENCOUNTER — HOME CARE VISIT (OUTPATIENT)
Dept: HOME HEALTH SERVICES | Facility: HOME HEALTHCARE | Age: 71
End: 2021-08-05

## 2021-08-05 VITALS
HEART RATE: 82 BPM | DIASTOLIC BLOOD PRESSURE: 80 MMHG | SYSTOLIC BLOOD PRESSURE: 118 MMHG | TEMPERATURE: 96.2 F | RESPIRATION RATE: 18 BRPM | OXYGEN SATURATION: 95 %

## 2021-08-05 PROCEDURE — G0153 HHCP-SVS OF S/L PATH,EA 15MN: HCPCS

## 2021-08-05 NOTE — CASE COMMUNICATION
patient d/c from nursing only   will continue to work with speech.   nomnoc form signed and placed in chart

## 2021-08-05 NOTE — CASE COMMUNICATION
Dear Ana M GARSIA did not see patient on 7/30 as mathew could not contact patient by phone for visit.  Therefore nursing unable to met ordered frequency.    With Regards,   Anila Byrd RN

## 2021-08-05 NOTE — HOME HEALTH
CVA  patient went to PMD yesterday and everything checked out ok.   She is up in home no ad, still having speech   working with speech.

## 2021-08-09 ENCOUNTER — HOME CARE VISIT (OUTPATIENT)
Dept: HOME HEALTH SERVICES | Facility: HOME HEALTHCARE | Age: 71
End: 2021-08-09

## 2021-08-09 VITALS — RESPIRATION RATE: 18 BRPM | SYSTOLIC BLOOD PRESSURE: 153 MMHG | DIASTOLIC BLOOD PRESSURE: 79 MMHG | HEART RATE: 74 BPM

## 2021-08-09 PROCEDURE — G0153 HHCP-SVS OF S/L PATH,EA 15MN: HCPCS

## 2021-08-11 ENCOUNTER — HOME CARE VISIT (OUTPATIENT)
Dept: HOME HEALTH SERVICES | Facility: HOME HEALTHCARE | Age: 71
End: 2021-08-11

## 2021-08-11 PROCEDURE — G0153 HHCP-SVS OF S/L PATH,EA 15MN: HCPCS

## 2021-08-12 ENCOUNTER — TELEPHONE (OUTPATIENT)
Dept: NEUROLOGY | Facility: CLINIC | Age: 71
End: 2021-08-12

## 2021-08-12 VITALS
TEMPERATURE: 97.7 F | HEART RATE: 85 BPM | OXYGEN SATURATION: 95 % | SYSTOLIC BLOOD PRESSURE: 107 MMHG | DIASTOLIC BLOOD PRESSURE: 77 MMHG

## 2021-08-13 ENCOUNTER — HOME CARE VISIT (OUTPATIENT)
Dept: HOME HEALTH SERVICES | Facility: HOME HEALTHCARE | Age: 71
End: 2021-08-13

## 2021-08-13 PROCEDURE — G0153 HHCP-SVS OF S/L PATH,EA 15MN: HCPCS

## 2021-08-16 ENCOUNTER — HOME CARE VISIT (OUTPATIENT)
Dept: HOME HEALTH SERVICES | Facility: HOME HEALTHCARE | Age: 71
End: 2021-08-16

## 2021-08-16 VITALS
TEMPERATURE: 98.7 F | HEART RATE: 77 BPM | SYSTOLIC BLOOD PRESSURE: 121 MMHG | DIASTOLIC BLOOD PRESSURE: 85 MMHG | OXYGEN SATURATION: 93 %

## 2021-08-16 PROCEDURE — G0153 HHCP-SVS OF S/L PATH,EA 15MN: HCPCS

## 2021-08-18 ENCOUNTER — OFFICE VISIT (OUTPATIENT)
Dept: CARDIOLOGY | Facility: CLINIC | Age: 71
End: 2021-08-18

## 2021-08-18 ENCOUNTER — HOME CARE VISIT (OUTPATIENT)
Dept: HOME HEALTH SERVICES | Facility: HOME HEALTHCARE | Age: 71
End: 2021-08-18

## 2021-08-18 VITALS
DIASTOLIC BLOOD PRESSURE: 102 MMHG | WEIGHT: 179.4 LBS | HEIGHT: 61 IN | SYSTOLIC BLOOD PRESSURE: 160 MMHG | BODY MASS INDEX: 33.87 KG/M2 | HEART RATE: 98 BPM

## 2021-08-18 DIAGNOSIS — I63.9 ACUTE CVA (CEREBROVASCULAR ACCIDENT) (HCC): Primary | ICD-10-CM

## 2021-08-18 PROCEDURE — 99214 OFFICE O/P EST MOD 30 MIN: CPT | Performed by: INTERNAL MEDICINE

## 2021-08-18 RX ORDER — UBIDECARENONE 75 MG
100 CAPSULE ORAL DAILY
COMMUNITY

## 2021-08-18 NOTE — PROGRESS NOTES
[facility]      Patient Name: Ambar Bingham  :1950  Age: 70 y.o.  Encounter Provider:  Robson So Jr, MD      Chief Complaint: Follow-up CVA      HPI  Ambar Bingham is a 70 y.o. female past medical history of diabetes hypertension who was seen in the hospital for CVA.  Patient was seen on  in the emergency room at [facility] for aphasia.  MRI revealed large area of tissue at risk in the left temporal lobe.  tPA given and the patient monitored in ICU thereafter.  I was consulted for evaluation of MINDY.  MINDY performed with no overt source of cardiac embolism noted.  Linq loop recorder placed by Dr. Bruner.  There have been to remote interrogation since then with no evidence of sustained arrhythmias.  Patient has been doing fairly well since discharge.  Unfortunately her son passed away this past weekend.  She is tearful and anxious but otherwise has no complaints.  She is getting back to normal activity levels with no limiting symptoms.  Blood pressure is elevated today in clinic.  Social and family history reviewed and not pertinent to this clinic visit.      The following portions of the patient's history were reviewed and updated as appropriate: allergies, current medications, past family history, past medical history, past social history, past surgical history and problem list.      Review of Systems   Constitutional: Negative for chills and fever.   HENT: Negative for hoarse voice and sore throat.    Eyes: Negative for double vision and photophobia.   Cardiovascular: Negative for chest pain, leg swelling, near-syncope, orthopnea, palpitations, paroxysmal nocturnal dyspnea and syncope.   Respiratory: Negative for cough and wheezing.    Skin: Negative for poor wound healing and rash.   Musculoskeletal: Negative for arthritis and joint swelling.   Gastrointestinal: Negative for bloating, abdominal pain, hematemesis and hematochezia.   Neurological: Negative for dizziness and focal  "weakness.   Psychiatric/Behavioral: Negative for depression and suicidal ideas.       OBJECTIVE:   Vital Signs  Vitals:    08/18/21 1401   BP: (!) 160/102   Pulse: 98     Estimated body mass index is 33.9 kg/m² as calculated from the following:    Height as of this encounter: 154.9 cm (61\").    Weight as of this encounter: 81.4 kg (179 lb 6.4 oz).    Vitals reviewed.   Constitutional:       Appearance: Healthy appearance. Not in distress.   Neck:      Vascular: No JVR. JVD normal.   Pulmonary:      Effort: Pulmonary effort is normal.      Breath sounds: Normal breath sounds. No wheezing. No rhonchi. No rales.   Chest:      Chest wall: Not tender to palpatation.   Cardiovascular:      PMI at left midclavicular line. Normal rate. Regular rhythm. Normal S1. Normal S2.      Murmurs: There is no murmur.      No gallop. No click. No rub.   Pulses:     Intact distal pulses.   Edema:     Peripheral edema absent.   Abdominal:      General: Bowel sounds are normal.      Palpations: Abdomen is soft.      Tenderness: There is no abdominal tenderness.   Musculoskeletal: Normal range of motion.         General: No tenderness. Skin:     General: Skin is warm and dry.   Neurological:      Mental Status: Alert and oriented to person, place and time.         Procedures          ASSESSMENT:     History of CVA  Left atrial dilation  Hypertension  Diabetes      PLAN OF CARE:     1. History of CVA -no evidence for cardiac source of embolism or evidence for arrhythmia on loop recorder.  We will continue to monitor.  She is currently on aspirin and statin.  Will need repeat lipid profile.  2. Hypertension -poorly controlled today however the patient is quite emotional and tearful secondary to the recent death of her son.  Twice daily blood pressure log.  Sodium restricted diet.  3. Left atrial dilation -arrhythmia monitoring as noted above.  4. Diabetes    Return to clinic 6 months             Discharge Medications          Accurate as of " August 18, 2021  2:01 PM. If you have any questions, ask your nurse or doctor.            Continue These Medications      Instructions Start Date   aspirin 325 MG tablet   325 mg, Oral, Daily      atorvastatin 80 MG tablet  Commonly known as: LIPITOR   80 mg, Oral, Nightly      FLUoxetine 20 MG capsule  Commonly known as: PROzac   20 mg, Oral, Daily      levothyroxine 100 MCG tablet  Commonly known as: SYNTHROID, LEVOTHROID   100 mcg, Oral, Daily      lisinopril-hydrochlorothiazide 20-12.5 MG per tablet  Commonly known as: PRINZIDE,ZESTORETIC   1 tablet, Oral, Daily      metFORMIN 1000 MG tablet  Commonly known as: GLUCOPHAGE   1,000 mg, Oral, 2 Times Daily With Meals      vitamin B-12 100 MCG tablet  Commonly known as: CYANOCOBALAMIN   100 mcg, Oral, Daily      Vitamin D (Cholecalciferol) 50 MCG (2000 UT) capsule   400 Units, Oral, Daily             Thank you for allowing me to participate in the care of your patient,      Sincerely,   Robson So MD  [facility]  08/18/21  14:01 EDT

## 2021-08-19 ENCOUNTER — HOME CARE VISIT (OUTPATIENT)
Dept: HOME HEALTH SERVICES | Facility: HOME HEALTHCARE | Age: 71
End: 2021-08-19

## 2021-08-20 ENCOUNTER — TELEPHONE (OUTPATIENT)
Dept: CARDIOLOGY | Facility: CLINIC | Age: 71
End: 2021-08-20

## 2021-08-20 NOTE — TELEPHONE ENCOUNTER
Spoke to patient about atrial tachycardia events.  We will start low-dose metoprolol and monitor rhythm closely.

## 2021-08-20 NOTE — TELEPHONE ENCOUNTER
After reviewing pt's LOOP transmission I see that there have been a total of 37 AT events on device counters since July 2021 all lasting 2-6 mins each. Avg V rates during events range from 70s-130s bpm. AT/AF burden is 0.2%. I have included a strip from 8/17/21 AT event. I have also attached a list of events below FYI. Thank you.

## 2021-08-23 ENCOUNTER — HOME CARE VISIT (OUTPATIENT)
Dept: HOME HEALTH SERVICES | Facility: HOME HEALTHCARE | Age: 71
End: 2021-08-23

## 2021-08-25 ENCOUNTER — HOME CARE VISIT (OUTPATIENT)
Dept: HOME HEALTH SERVICES | Facility: HOME HEALTHCARE | Age: 71
End: 2021-08-25

## 2021-08-27 ENCOUNTER — HOME CARE VISIT (OUTPATIENT)
Dept: HOME HEALTH SERVICES | Facility: HOME HEALTHCARE | Age: 71
End: 2021-08-27

## 2021-08-27 ENCOUNTER — CLINICAL SUPPORT NO REQUIREMENTS (OUTPATIENT)
Dept: CARDIOLOGY | Facility: CLINIC | Age: 71
End: 2021-08-27

## 2021-08-27 DIAGNOSIS — R00.2 PALPITATIONS: Primary | ICD-10-CM

## 2021-08-27 PROCEDURE — 93285 PRGRMG DEV EVAL SCRMS IP: CPT | Performed by: INTERNAL MEDICINE

## 2021-08-27 NOTE — TELEPHONE ENCOUNTER
Pt in office to have loop tachy detecrtion parameters changed to 150 per Dr José, check done by rep Villatoro with [facility]. Pt tachy parameter already set @ 150 bpm, Shauna recommended and changed detection to 167 as was next level above current detection settings. Pt also had AT regular detection window set at >70, Shauna recommend change to AT regular detection setting of > 100, which was highest detection setting.

## 2021-08-30 ENCOUNTER — HOME CARE VISIT (OUTPATIENT)
Dept: HOME HEALTH SERVICES | Facility: HOME HEALTHCARE | Age: 71
End: 2021-08-30

## 2021-08-30 VITALS
HEART RATE: 60 BPM | OXYGEN SATURATION: 97 % | RESPIRATION RATE: 18 BRPM | DIASTOLIC BLOOD PRESSURE: 66 MMHG | TEMPERATURE: 97.7 F | SYSTOLIC BLOOD PRESSURE: 114 MMHG

## 2021-08-30 PROCEDURE — G0153 HHCP-SVS OF S/L PATH,EA 15MN: HCPCS

## 2021-09-01 ENCOUNTER — HOME CARE VISIT (OUTPATIENT)
Dept: HOME HEALTH SERVICES | Facility: HOME HEALTHCARE | Age: 71
End: 2021-09-01

## 2021-09-01 VITALS
TEMPERATURE: 98.1 F | RESPIRATION RATE: 18 BRPM | OXYGEN SATURATION: 98 % | SYSTOLIC BLOOD PRESSURE: 134 MMHG | DIASTOLIC BLOOD PRESSURE: 77 MMHG | HEART RATE: 67 BPM

## 2021-09-01 PROCEDURE — G0153 HHCP-SVS OF S/L PATH,EA 15MN: HCPCS

## 2021-09-03 ENCOUNTER — HOME CARE VISIT (OUTPATIENT)
Dept: HOME HEALTH SERVICES | Facility: HOME HEALTHCARE | Age: 71
End: 2021-09-03

## 2021-09-03 PROCEDURE — G0153 HHCP-SVS OF S/L PATH,EA 15MN: HCPCS

## 2021-09-06 VITALS
OXYGEN SATURATION: 93 % | SYSTOLIC BLOOD PRESSURE: 109 MMHG | DIASTOLIC BLOOD PRESSURE: 67 MMHG | RESPIRATION RATE: 18 BRPM | TEMPERATURE: 97.7 F | HEART RATE: 64 BPM

## 2021-09-08 ENCOUNTER — HOME CARE VISIT (OUTPATIENT)
Dept: HOME HEALTH SERVICES | Facility: HOME HEALTHCARE | Age: 71
End: 2021-09-08

## 2021-09-08 VITALS
DIASTOLIC BLOOD PRESSURE: 86 MMHG | TEMPERATURE: 97.7 F | SYSTOLIC BLOOD PRESSURE: 154 MMHG | OXYGEN SATURATION: 96 % | RESPIRATION RATE: 19 BRPM | HEART RATE: 61 BPM

## 2021-09-08 PROCEDURE — G0153 HHCP-SVS OF S/L PATH,EA 15MN: HCPCS

## 2021-09-10 ENCOUNTER — HOME CARE VISIT (OUTPATIENT)
Dept: HOME HEALTH SERVICES | Facility: HOME HEALTHCARE | Age: 71
End: 2021-09-10

## 2021-09-10 ENCOUNTER — TELEPHONE (OUTPATIENT)
Dept: CARDIOLOGY | Facility: CLINIC | Age: 71
End: 2021-09-10

## 2021-09-10 PROCEDURE — G0153 HHCP-SVS OF S/L PATH,EA 15MN: HCPCS

## 2021-09-10 NOTE — TELEPHONE ENCOUNTER
Voicemail box not set up.  Good blood pressure control with adequate heart rate after initiation of beta-blocker.  Will discuss at next scheduled visit.

## 2021-09-12 VITALS
SYSTOLIC BLOOD PRESSURE: 127 MMHG | OXYGEN SATURATION: 94 % | TEMPERATURE: 97.7 F | DIASTOLIC BLOOD PRESSURE: 65 MMHG | HEART RATE: 59 BPM

## 2021-09-13 ENCOUNTER — HOME CARE VISIT (OUTPATIENT)
Dept: HOME HEALTH SERVICES | Facility: HOME HEALTHCARE | Age: 71
End: 2021-09-13

## 2021-09-13 PROCEDURE — G0153 HHCP-SVS OF S/L PATH,EA 15MN: HCPCS

## 2021-09-14 VITALS
SYSTOLIC BLOOD PRESSURE: 138 MMHG | DIASTOLIC BLOOD PRESSURE: 85 MMHG | TEMPERATURE: 97.7 F | OXYGEN SATURATION: 92 % | HEART RATE: 62 BPM

## 2021-09-15 ENCOUNTER — HOME CARE VISIT (OUTPATIENT)
Dept: HOME HEALTH SERVICES | Facility: HOME HEALTHCARE | Age: 71
End: 2021-09-15

## 2021-09-15 PROCEDURE — G0153 HHCP-SVS OF S/L PATH,EA 15MN: HCPCS

## 2021-09-16 VITALS
OXYGEN SATURATION: 98 % | HEART RATE: 63 BPM | RESPIRATION RATE: 18 BRPM | TEMPERATURE: 97.7 F | DIASTOLIC BLOOD PRESSURE: 63 MMHG | SYSTOLIC BLOOD PRESSURE: 100 MMHG

## 2021-09-17 ENCOUNTER — HOME CARE VISIT (OUTPATIENT)
Dept: HOME HEALTH SERVICES | Facility: HOME HEALTHCARE | Age: 71
End: 2021-09-17

## 2021-09-17 VITALS
HEART RATE: 57 BPM | TEMPERATURE: 97.9 F | DIASTOLIC BLOOD PRESSURE: 86 MMHG | OXYGEN SATURATION: 97 % | RESPIRATION RATE: 18 BRPM | SYSTOLIC BLOOD PRESSURE: 137 MMHG

## 2021-09-17 PROCEDURE — G0153 HHCP-SVS OF S/L PATH,EA 15MN: HCPCS

## 2021-09-21 ENCOUNTER — TELEPHONE (OUTPATIENT)
Dept: NEUROLOGY | Facility: CLINIC | Age: 71
End: 2021-09-21

## 2021-09-21 NOTE — TELEPHONE ENCOUNTER
Caller: Ambar Bingham    Relationship: Self    Best call back number: [phone]    What form or medical record are you requesting: RETURN TO WORK LETTER    Who is requesting this form or medical record from you: EMPLOYER-[employer]    How would you like to receive the form or medical records (pick-up, mail, fax): PICK-UP    Timeframe paperwork needed: ASAP    Additional notes: PT'S FMLA RUNS OUT ON TUES 09/28/21 AND IF SHE DOES NOT RETURN TO WORK AT THIS TIME, SHE WILL LOSE HER JOB. PT IS REQUESTING A RETURN TO WORK NOTE.  SHE STATES SHE CANNOT AFFORD TO LOSE HER JOB AND IS FEELING FINE ENOUGH TO RETURN TO WORK.  SHE HAS SEEN HER PCP AND HAS SEEN HER CARDIOLOGIST AND STATES SHE IS DOING WELL.    PT ALSO ASKED FOR A CALL BACK REGARDING THIS MATTER.  PT WAS RATHER EMOTIONAL TOWARDS THE END OF THE CALL, MENTIONING THE DEATH OF HER SON.  SHE STATES SHE NEEDS TO WORK BECAUSE SHE CAN'T SIT AROUND THE HOUSE ANYMORE AND THE THOUGHT OF LOSING HER JOB IS VERY UPSETTING TO HER.

## 2021-09-22 NOTE — TELEPHONE ENCOUNTER
Scheduled pt with Ebonie Micthell on Friday, 09/24/21, arrive at 12:30 pm for a 1 pm appt. Pt agreeable and voiced understanding.

## 2021-09-24 ENCOUNTER — OFFICE VISIT (OUTPATIENT)
Dept: NEUROLOGY | Facility: CLINIC | Age: 71
End: 2021-09-24

## 2021-09-24 ENCOUNTER — TELEPHONE (OUTPATIENT)
Dept: NEUROLOGY | Facility: CLINIC | Age: 71
End: 2021-09-24

## 2021-09-24 VITALS
RESPIRATION RATE: 16 BRPM | WEIGHT: 180 LBS | OXYGEN SATURATION: 99 % | HEART RATE: 60 BPM | BODY MASS INDEX: 33.99 KG/M2 | DIASTOLIC BLOOD PRESSURE: 80 MMHG | SYSTOLIC BLOOD PRESSURE: 130 MMHG | HEIGHT: 61 IN

## 2021-09-24 DIAGNOSIS — E11.9 TYPE 2 DIABETES MELLITUS WITHOUT COMPLICATION, WITHOUT LONG-TERM CURRENT USE OF INSULIN (HCC): ICD-10-CM

## 2021-09-24 DIAGNOSIS — E53.8 LOW VITAMIN B12 LEVEL: ICD-10-CM

## 2021-09-24 DIAGNOSIS — E78.2 MIXED HYPERLIPIDEMIA: ICD-10-CM

## 2021-09-24 DIAGNOSIS — Z86.73 RECENT CEREBROVASCULAR ACCIDENT (CVA): Primary | ICD-10-CM

## 2021-09-24 DIAGNOSIS — I10 PRIMARY HYPERTENSION: ICD-10-CM

## 2021-09-24 PROCEDURE — 99215 OFFICE O/P EST HI 40 MIN: CPT | Performed by: NURSE PRACTITIONER

## 2021-09-24 NOTE — TELEPHONE ENCOUNTER
Provider: PAULIE SOLANO      Caller: KEV    Relationship to Patient: SELF    Reason for Call: KEV WAS IN TODAY AND SHE GOT A RETURN TO WORK NOTE BUT IT NEEDS TO SAY NO RESTRICTIONS.    EVERY THING ELSE ON THE NOTE IS FINE.    SHE NEEDS THIS NOTE ASAP SO SHE DOES NOT LOOSE HER JOB.    PLEASE MAKE SURE IT STATES NO RESTRICTIONS.      FAX [phone]          When was the patient last seen: 9-24-21

## 2021-09-24 NOTE — TELEPHONE ENCOUNTER
Please review and advise on work note. I don't mind to type up a letter if approve the no restrictions.     Thank you.

## 2021-09-24 NOTE — TELEPHONE ENCOUNTER
"Informed pt Ebonie approved new letter with \"no restrictions\" added. Faxed to Jacquie at provided fax number.  "

## 2021-10-02 PROBLEM — R79.89 LOW VITAMIN B12 LEVEL: Status: ACTIVE | Noted: 2021-10-02

## 2021-10-02 PROBLEM — E11.9 TYPE 2 DIABETES MELLITUS, WITHOUT LONG-TERM CURRENT USE OF INSULIN: Status: ACTIVE | Noted: 2021-10-02

## 2021-10-02 PROBLEM — Z86.73 RECENT CEREBROVASCULAR ACCIDENT (CVA): Status: ACTIVE | Noted: 2021-10-02

## 2021-10-02 PROBLEM — E78.2 MIXED HYPERLIPIDEMIA: Status: ACTIVE | Noted: 2021-10-02

## 2021-10-02 PROBLEM — E53.8 LOW VITAMIN B12 LEVEL: Status: ACTIVE | Noted: 2021-10-02

## 2021-10-02 PROBLEM — I10 PRIMARY HYPERTENSION: Status: ACTIVE | Noted: 2021-10-02

## 2021-10-19 ENCOUNTER — CALL CENTER PROGRAMS (OUTPATIENT)
Dept: CALL CENTER | Facility: HOSPITAL | Age: 71
End: 2021-10-19

## 2021-10-19 NOTE — OUTREACH NOTE
Stroke Cesar Survey      Responses   [facility]   [facility] successful No   Unsuccessful attempts Attempt 1          Tara Nieves RN

## 2021-10-20 ENCOUNTER — CALL CENTER PROGRAMS (OUTPATIENT)
Dept: CALL CENTER | Facility: HOSPITAL | Age: 71
End: 2021-10-20

## 2021-10-20 NOTE — OUTREACH NOTE
Stroke Cesar Survey      Respons[facility]   [facility] successful No   Unsuccessful attempts Attempt 2          Tara Nieves RN

## 2021-10-21 ENCOUNTER — CALL CENTER PROGRAMS (OUTPATIENT)
Dept: CALL CENTER | Facility: HOSPITAL | Age: 71
End: 2021-10-21

## 2021-10-21 NOTE — OUTREACH NOTE
Stroke Cesar Survey      Respons[facility]   [facility]   Attempt successful No   Unsuccessful attempts Attempt 3   Call Center Prospect Score 7          Tara Nieves RN

## 2021-10-31 PROCEDURE — G2066 INTER DEVC REMOTE 30D: HCPCS | Performed by: INTERNAL MEDICINE

## 2021-10-31 PROCEDURE — 93298 REM INTERROG DEV EVAL SCRMS: CPT | Performed by: INTERNAL MEDICINE

## 2021-12-01 PROCEDURE — G2066 INTER DEVC REMOTE 30D: HCPCS | Performed by: INTERNAL MEDICINE

## 2021-12-01 PROCEDURE — 93298 REM INTERROG DEV EVAL SCRMS: CPT | Performed by: INTERNAL MEDICINE

## 2022-01-01 PROCEDURE — G2066 INTER DEVC REMOTE 30D: HCPCS | Performed by: INTERNAL MEDICINE

## 2022-01-01 PROCEDURE — 93298 REM INTERROG DEV EVAL SCRMS: CPT | Performed by: INTERNAL MEDICINE

## 2022-02-01 PROCEDURE — G2066 INTER DEVC REMOTE 30D: HCPCS | Performed by: INTERNAL MEDICINE

## 2022-02-01 PROCEDURE — 93298 REM INTERROG DEV EVAL SCRMS: CPT | Performed by: INTERNAL MEDICINE

## 2022-02-22 ENCOUNTER — HOSPITAL ENCOUNTER (EMERGENCY)
Facility: HOSPITAL | Age: 72
Discharge: HOME | End: 2022-02-22
Attending: EMERGENCY MEDICINE | Admitting: EMERGENCY MEDICINE

## 2022-02-22 ENCOUNTER — APPOINTMENT (OUTPATIENT)
Dept: CT IMAGING | Facility: HOSPITAL | Age: 72
End: 2022-02-22

## 2022-02-22 VITALS
RESPIRATION RATE: 16 BRPM | DIASTOLIC BLOOD PRESSURE: 88 MMHG | OXYGEN SATURATION: 92 % | HEART RATE: 67 BPM | SYSTOLIC BLOOD PRESSURE: 170 MMHG | TEMPERATURE: 98.9 F

## 2022-02-22 DIAGNOSIS — S00.83XA TRAUMATIC HEMATOMA OF FOREHEAD, INITIAL ENCOUNTER: Primary | ICD-10-CM
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
## 2022-02-22 PROCEDURE — 72125 CT NECK SPINE W/O DYE: CPT

## 2022-02-22 PROCEDURE — 70450 CT HEAD/BRAIN W/O DYE: CPT

## 2022-02-22 PROCEDURE — 99283 EMERGENCY DEPT VISIT LOW MDM: CPT

## 2022-02-22 NOTE — ED TRIAGE NOTES
.Pt masked on arrival, staff masked    Pt reports work comp injury, fell at work and hit head, hematoma noted to forehead, denies loc, went to [facility] and referred here

## 2022-02-22 NOTE — DISCHARGE INSTRUCTIONS
Hold Aspirin Wednesday, can resume Thursday    Take Tylenol as needed for headache/pain    Ice pack on and off to forehead    Return Precautions    Although you are being discharged from the ED today, I encourage you to return for worsening symptoms.  Things can, and do, change such that treatment at home with medication may not be adequate.      Specifically, return for any of the following:    Chest pain, shortness of breath, pain or nausea and vomiting not controlled by medications provided.    Please make a follow up with your Primary Care Provider for a blood pressure recheck.

## 2022-02-22 NOTE — ED PROVIDER NOTES
EMERGENCY DEPARTMENT ENCOUNTER    Room Number:  B01/01  Date seen:  2/22/2022  Time seen: 13:11 EST  PCP: Ana M Abdalla APRN  Historian: patient    HPI:  Chief complaint:head injury, forehead contusion  A complete HPI/ROS/PMH/PSH/SH/FH are unobtainable due to: n/a  Context:Ambar Bingham is a 71 y.o. female with history of stroke on Aspirin who presents to the ED with c/o forehead hematoma after trip and fall at her work [employer].  She states she had mechanical fall that was not preceded by any lightheadedness or dizziness.  States the momentum caused her to fall forward striking her forehead on floor.  She states she hit hard was surprised she didn't have a bleeding wound. Denies headache at this time.  Her symptoms are not made better/worse by anything. She has not had this problem before.      Patient was placed in face mask in first look. Patient was wearing facemask when I entered the room and throughout our encounter. I wore full protective equipment throughout this patient encounter including a N95 face mask, eye shield and gloves. Hand hygiene/washing of hands was performed before donning protective equipment and after removal when leaving the room.    MEDICAL RECORD REVIEW    ALLERGIES  T-pa [alteplase]    PAST MEDICAL HISTORY  Active Ambulatory Problems     Diagnosis Date Noted   • Acute CVA (cerebrovascular accident) ([facility]) 07/06/2021   • Recent cerebrovascular accident (CVA) 10/02/2021   • Primary hypertension 10/02/2021   • Mixed hyperlipidemia 10/02/2021   • Type 2 diabetes mellitus, without long-term current use of insulin ([facility]) 10/02/2021   • Low vitamin B12 level 10/02/2021     Resolved Ambulatory Problems     Diagnosis Date Noted   • No Resolved Ambulatory Problems     No Additional Past Medical History       PAST SURGICAL HISTORY  Past Surgical History:   Procedure Laterality Date   • CARDIAC ELECTROPHYSIOLOGY PROCEDURE N/A 7/9/2021    Procedure: Loop insertion---[employer];  Surgeon: Singh Bruner  MD Ehsan;  Location: [facility] INVASIVE LOCATION;  Service: Cardiovascular;  Laterality: N/A;       FAMILY HISTORY  No family history on file.    SOCIAL HISTORY  Social History     Socioeconomic History   • Marital status:    Tobacco Use   • Smoking status: Never Smoker   • Smokeless tobacco: Never Used   Substance and Sexual Activity   • Alcohol use: Never     Comment: caffeine use- coffee       REVIEW OF SYSTEMS  Review of Systems    All systems reviewed and negative except for those discussed in HPI.     PHYSICAL EXAM    ED Triage Vitals   Temp Heart Rate Resp BP SpO2   02/22/22 1305 02/22/22 1305 02/22/22 1305 02/22/22 1308 02/22/22 1305   98.9 °F (37.2 °C) 67 16 170/88 92 %      Temp src Heart Rate Source Patient Position BP Location FiO2 (%)   -- -- -- -- --            Physical Exam    I have reviewed the triage vital signs and nursing notes.      GENERAL: not distressed  HENT: nares patent, moderate forehead hematoma, no orbital tenderness, no mandibular/maxillary tenderness  EYES: no scleral icterus, PERRL, EOMI  NECK: no ROM limitations,no focal   CV: regular rhythm, regular rate, no murmur, no rubs, no gallups  RESPIRATORY: normal effort, CTAB  ABDOMEN: soft  : deferred  MUSCULOSKELETAL: no deformity, no pain to bilateral hands/wrists.  She does have some chronic right shoulder discomfort.    NEURO: alert, moves all extremities, follows commands  SKIN: warm, dry      RADIOLOGY RESULTS  CT Head Without Contrast, CT Cervical Spine Without Contrast    Result Date: 2/22/2022  EMERGENCY NONCONTRAST HEAD CT AND NONCONTRAST CERVICAL SPINE CT 02/22/2022  CLINICAL HISTORY: Patient fell, large scalp hematoma anterior-inferior forehead, has headache and neck pain.  HEAD CT TECHNIQUE: Spiral CT images were obtained from the base of the skull to the vertex without intravenous contrast and images were reformatted and submitted in 3 mm thick axial CT section with brain algorithm and 2 mm thick axial CT  section high-resolution bone algorithm and 2 mm thick sagittal and coronal reconstructions were performed and submitted in brain algorithm.  COMPARISON: This is correlated to a prior noncontrast head CT 07/07/2021 and prior MRI of the brain 07/06/2021 and contrast-enhanced CT angiogram of the head and neck and CT perfusion study 07/06/2021.    FINDINGS: There is encephalomalacia tracking throughout the mid and posterior lateral left temporal lobe into the anterior lateral left occipital lobe compatible with an old lateral left temporal occipital infarct in the distribution of the temporal occipital branches of the inferior division of the left middle cerebral artery territory and this infarct occurred back on 07/06/2021. The old infarct measures up to 6.5 x 2.2 x 2 cm in anterior, posterior, and medial, lateral and craniocaudal dimension. The remainder of the brain parenchyma is normal in attenuation. The ventricles are normal in size. I see no focal mass effect. There is no midline shift. No extra axial fluid collections are identified. There is no evidence of acute intracranial hemorrhage. There is a large scalp hematoma over the anterior frontal bone from today's head trauma. No underlying acute skull fracture is identified. There are 2 nodular ossific densities anterior to the frontal bones, 1 anterior to the medial left frontal bone measures 8 x 6 mm and anterior to the anteromedial right frontal bone measures 8 x 3 mm and they are contiguous with the external cortex and I suspect these represent old calcified subperiosteal hematomas from prior head trauma. Paranasal sinuses, mastoid air cells and middle ear cavities are clear.      1. No acute intracranial abnormality seen. There is a moderate size area of encephalomalacia extending from the mid to posterior lateral left temporal lobe into the anterior lateral left occipital lobe measuring 6.5 x 2.2 x 2 cm that is compatible with an old lateral left temporal  occipital infarct in the distribution of the temporal occipital branches of the inferior division left middle cerebral artery territory 2. There is a large scalp hematoma over the anterior midline of the frontal bone from today's head trauma. The remainder of the head CT is within normal limits with no acute skull fracture or intracranial hemorrhage identified.  CERVICAL SPINE CT TECHNIQUE: Spiral CT images were obtained from the skull base down to the superior body of the T2 thoracic level and images were reformatted and submitted in 2 mm thick axial and sagittal CT sections with soft tissue algorithm, 1 mm thick axial, sagittal and coronal CT sections with high-resolution bone algorithm.  COMPARISON: There are no prior cervical spine CTs for comparison.  FINDINGS: The atlantooccipital articulation is normal in appearance.  At C1-2 there are prominent arthritic changes at the atlantodental interval with narrowing of the interval, marginal spurring off the anterior ring of C1 and odontoid and sclerosis in the anterior ring of C1 and odontoid. There is also some focal ossification inferior to the anterior ring of C1 anterior to the anterior superior body of C2 that focal ossification measuring 8 x 5 mm, otherwise the C1-2 level is normal in appearance.  At C2-3 there is anterior marginal endplate spurring that is very minimal. There is no canal or right foraminal narrowing. There is mild left uncovertebral joint hypertrophy with mild left foraminal narrowing.  At C3-4 there is anterior marginal endplate spurring. There is mild diffuse posterior disc osteophyte complex that results in mild canal narrowing. There is minimal facet overgrowth and mild uncovertebral joint hypertrophy and there is mild right and mild-to-moderate left bony foraminal narrowing.  At C4-5 there is some disc space narrowing, degenerative endplate change, mild diffuse posterior disc osteophyte complex with mild canal narrowing. There is mild  left facet overgrowth. The right facets are normal. There is some bilateral uncovertebral joint hypertrophy left greater than right and there is mild right and mild-to-moderate left bony foraminal narrowing.  At C5-6 there is disc space narrowing. There are degenerative endplate changes, diffuse posterior disc osteophyte complex results in mild canal narrowing. The facets are normal. There is bilateral uncovertebral joint hypertrophy and there is mild-to-moderate bilateral bony foraminal.  At C6-7 the images are very grainy, limits evaluation posterior disc margin. Posterior endplates are normal with no canal narrowing. There is minimal left uncovertebral joint hypertrophy. The facets are normal and there is minimal left and no right foraminal narrowing.  At C7-T1 there is mild bilateral facet overgrowth. Posterior disc margin is obscured due to the graininess of the images. The posterior endplates are normal and I see no bony canal or foraminal narrowing.  No acute fracture is seen in the cervical spine.  IMPRESSION:  1. No acute fracture is seen in the cervical spine. There is cervical spondylosis as described in great detail above.  Radiation dose reduction techniques were utilized, including automated exposure control and exposure modulation based on body size.            PROGRESS, DATA ANALYSIS, CONSULTS AND MEDICAL DECISION MAKING  All labs have been independently reviewed by me.  All radiology studies have been reviewed by me and discussed with radiologist dictating the report.  EKG's independently viewed and interpreted by me unless stated otherwise. Discussion below represents my analysis of pertinent findings related to patient's condition, differential diagnosis, treatment plan and final disposition.     ED Course as of 02/22/22 1651   Tue Feb 22, 2022   1440 Discussed CT head and cervical spine with Dr. Diane, Radiologist.  No acute intracranial or cervical abnormalities.  [EW]   1453 MDM/dispo:  I  updated patient about CT's.  No acute problems.  She had no LOC.  I will have her hold ASA for one day.  She ambulates with steady gait.  To return to work on Friday.  [EW]      ED Course User Index  [EW] Aaliyah Kahn APRN     DDX: trip and fall, ICH, frontal head contusion, forehead hematoma     Reviewed pt's history and workup with Dr. Randolph.  After a bedside evaluation, Dr. Randolph agrees with the plan of care.    The patient's history, physical exam, and lab findings were discussed with the physician, who also performed a face to face history and physical exam.  I discussed all results and noted any abnormalities with patient.  Discussed absoute need to recheck abnormalities with their family physician.  I answered any of the patient's questions.  Discussed plan for discharge, as there is no emergent indication for admission.  Pt is agreeable and understands need for follow up and repeat testing.  Pt is aware that discharge does not mean that nothing is wrong but it indicates no emergency is present and they must continue care with their family physician.  Pt is discharged with instructions to follow up with primary care doctor to have their blood pressure rechecked.       Disposition vitals:  /88   Pulse 67   Temp 98.9 °F (37.2 °C)   Resp 16   SpO2 92%       DIAGNOSIS  Final diagnoses:   Traumatic hematoma of forehead, initial encounter   Fall from slip, trip, or stumble, initial encounter       FOLLOW UP   Ana M Abdalla, APRN  [address]  [address]  [facility] [address]  [phone]    Schedule an appointment as soon as possible for a visit in 1 week  As needed         Aaliyah Kahn APRN  02/22/22 9061

## 2022-04-18 ENCOUNTER — TELEPHONE (OUTPATIENT)
Dept: CARDIOLOGY | Facility: CLINIC | Age: 72
End: 2022-04-18

## 2022-04-18 NOTE — TELEPHONE ENCOUNTER
Patient has LINQ and left voicemail asking if she can throw away the remote because she doesn't use it anymore.    Please advise.    Patient can be reached at [phone].    Thanks!